# Patient Record
Sex: MALE | Race: ASIAN | NOT HISPANIC OR LATINO | Employment: FULL TIME | URBAN - METROPOLITAN AREA
[De-identification: names, ages, dates, MRNs, and addresses within clinical notes are randomized per-mention and may not be internally consistent; named-entity substitution may affect disease eponyms.]

---

## 2017-10-06 ENCOUNTER — ALLSCRIPTS OFFICE VISIT (OUTPATIENT)
Dept: OTHER | Facility: OTHER | Age: 47
End: 2017-10-06

## 2017-10-07 LAB
A/G RATIO (HISTORICAL): 1.5 (ref 1.2–2.2)
ALBUMIN SERPL BCP-MCNC: 4.7 G/DL (ref 3.5–5.5)
ALP SERPL-CCNC: 78 IU/L (ref 39–117)
ALT SERPL W P-5'-P-CCNC: 81 IU/L (ref 0–44)
AST SERPL W P-5'-P-CCNC: 35 IU/L (ref 0–40)
BILIRUB SERPL-MCNC: 0.5 MG/DL (ref 0–1.2)
BUN SERPL-MCNC: 10 MG/DL (ref 6–24)
BUN/CREA RATIO (HISTORICAL): 12 (ref 9–20)
CALCIUM SERPL-MCNC: 9.3 MG/DL (ref 8.7–10.2)
CHLORIDE SERPL-SCNC: 100 MMOL/L (ref 96–106)
CHOLEST SERPL-MCNC: 170 MG/DL (ref 100–199)
CO2 SERPL-SCNC: 24 MMOL/L (ref 18–29)
CREAT SERPL-MCNC: 0.85 MG/DL (ref 0.76–1.27)
EGFR AFRICAN AMERICAN (HISTORICAL): 120 ML/MIN/1.73
EGFR-AMERICAN CALC (HISTORICAL): 104 ML/MIN/1.73
GLUCOSE SERPL-MCNC: 186 MG/DL (ref 65–99)
HBA1C MFR BLD HPLC: 7.4 % (ref 4.8–5.6)
HDLC SERPL-MCNC: 41 MG/DL
LDLC SERPL CALC-MCNC: 106 MG/DL (ref 0–99)
POTASSIUM SERPL-SCNC: 4.2 MMOL/L (ref 3.5–5.2)
SODIUM SERPL-SCNC: 141 MMOL/L (ref 134–144)
TOT. GLOBULIN, SERUM (HISTORICAL): 3.2 G/DL (ref 1.5–4.5)
TOTAL PROTEIN (HISTORICAL): 7.9 G/DL (ref 6–8.5)
TRIGL SERPL-MCNC: 116 MG/DL (ref 0–149)
TSH SERPL DL<=0.05 MIU/L-ACNC: 0.79 UIU/ML (ref 0.45–4.5)

## 2017-10-07 NOTE — PROGRESS NOTES
Assessment  1  Abnormal fasting glucose (790 29) (R73 01)   2  Hyperlipidemia (272 4) (E78 5)   3  Hypertension (401 9) (I10)   4  Anxiety (300 00) (F41 9)    Plan  Abnormal fasting glucose, Hyperlipidemia, Hypertension, Low serum vitamin D    · (1) COMPREHENSIVE METABOLIC PANEL; Status:Active; Requested for:06Oct2017;    · (1) HEMOGLOBIN A1C; Status:Active; Requested for:06Oct2017;    · (1) LIPID PANEL FASTING W DIRECT LDL REFLEX; Status:Active; Requested  for:06Oct2017;    · (1) TSH; Status:Active; Requested LDK:[id number]; Anxiety    · ALPRAZolam 0 5 MG Oral Tablet Disintegrating; TAKE 0 5 MG Daily PRN anxiety  for flying  Hyperlipidemia    · Atorvastatin Calcium 20 MG Oral Tablet; Take 1 tablet by mouth  daily  Need for vaccination    · Fluzone Quadrivalent Intramuscular Suspension; INJECT 0 5  ML  Intramuscular; To Be Done: [id number]    Discussion/Summary    Hyperlipidemia  Continue atorvastatin and check fasting lab work today  Continue valsartan hydrochlorothiazide  Well-controlled at present  Primarily due to air travel  Refill Xanax  Continue Advair and albuterol inhaler  Mild intermittent  vaccine today  Lab work to include lipid, chem panel, TSH  Recheck in 6-12 months  Chief Complaint  Pt presents for medication f/u  Pt would like to receive influenza vaccine  History of Present Illness  Patient was seen for follow-up of chronic medical problems  He's been treated for elevated glucose, hyperlipidemia, hypertension, anxiety and asthma  He uses Proventil inhaler intermittently for asthmatic flares  He's on no medication for his blood sugar  He takes atorvastatin for his lipids and valsartan hydrochlorothiazide for his blood pressure  He does take Xanax on a limited basis generally only for flying which she does frequently throughout the course of the year for work  Review of Systems    Constitutional: No fever or chills, feels well, no tiredness, no recent weight gain or weight loss  Eyes: No complaints of eye pain, no red eyes, no discharge from eyes, no itchy eyes  ENT: no complaints of earache, no hearing loss, no nosebleeds, no nasal discharge, no sore throat, no hoarseness  Cardiovascular: No complaints of slow heart rate, no fast heart rate, no chest pain, no palpitations, no leg claudication, no lower extremity  Respiratory: No complaints of shortness of breath, no wheezing, no cough, no SOB on exertion, no orthopnea or PND  Gastrointestinal: No complaints of abdominal pain, no constipation, no nausea or vomiting, no diarrhea or bloody stools  Genitourinary: No complaints of dysuria, no incontinence, no hesitancy, no nocturia, no genital lesion, no testicular pain  Musculoskeletal: Pain in coccyx  Integumentary: No complaints of skin rash or skin lesions, no itching, no skin wound, no dry skin  Neurological: No compliants of headache, no confusion, no convulsions, no numbness or tingling, no dizziness or fainting, no limb weakness, no difficulty walking  Psychiatric: anxiety  Endocrine: No complaints of proptosis, no hot flashes, no muscle weakness, no erectile dysfunction, no deepening of the voice, no feelings of weakness  Hematologic/Lymphatic: No complaints of swollen glands, no swollen glands in the neck, does not bleed easily, no easy bruising  Active Problems  1  Abnormal fasting glucose (790 29) (R73 01)   2  Anxiety (300 00) (F41 9)   3  Asthma (493 90) (J45 909)   4  Hyperlipidemia (272 4) (E78 5)   5  Hypertension (401 9) (I10)   6  Insomnia (780 52) (G47 00)   7  Low serum vitamin D (790 6) (R79 89)   8  Scrotal mass (608 89) (N50 9)    Past Medical History  1  History of Acute allergic reaction (995 3) (T78 40XA)   2  History of Chronic Strained Lumbosacral Ligament (847 2)   3  History of Contact Dermatitis Of The Arm (692 9)   4  History of Depression screen (V79 0) (Z13 89)   5  History of Ear pressure (388 8) (H93 8X9)   6   History of acute sinusitis (V12 69) (Z87 09)   7  History of Myalgia And Myositis (729 1)   8  History of Obstructive sleep apnea (327 23) (G47 33)   9  History of Skin tag (701 9) (L91 8)   10  History of Thoracic myofascial strain (847 1) (S29 019A)    The active problems and past medical history were reviewed and updated today  Surgical History  1  History of Appendectomy   2  Denied: History Of Prior Surgery    Family History  Father    1  Family history of Diabetes Mellitus   2  Family history of Heart Disease  Family History    3  Family history of Allergies   4  Family history of Asthma (V17 5)   5  Family history of Diabetes Mellitus   6  Family history of Eczema   7  Family history of Heart Disease   8  Family history of Hypertension (V17 49)   9  Family history of Stroke Syndrome (V17 1)    Social History   · Alcohol Use (History)   · Denied: History of Drug Use   · Never A Smoker  The social history was reviewed and updated today  The social history was reviewed and is unchanged  Current Meds   1  Advair Diskus 500-50 MCG/DOSE Inhalation Aerosol Powder Breath Activated; Use 1   inhalation two times  daily; Therapy: 96XEZ1382 to (Evaluate:63Ihg1038)  Requested for: 20Ojb4157; Last   Rx:43Rkw4325 Ordered   2  ALPRAZolam 0 5 MG Oral Tablet Disintegrating; TAKE 0 5 MG Daily PRN anxiety for flying; Therapy: 41SPX6720 to (Last Rx:22Mar2017) Ordered   3  Atorvastatin Calcium 20 MG Oral Tablet; Take 1 tablet by mouth  daily; Therapy: 09EMC4809 to (Evaluate:02Gyt1811)  Requested for: 99XAV4967; Last   Rx:15Jun2017 Ordered   4  EpiPen 2-Drew 0 3 MG/0 3ML Injection Solution Auto-injector; INJECT 0 3ML   INTRAMUSCULARLY AS DIRECTED; Therapy: 18Opb5792 to (Last Rx:15Kat4810)  Requested for: 21TOV5979 Ordered   5  Montelukast Sodium 10 MG Oral Tablet; Take 1 tablet by mouth  daily; Therapy: 04CCS2317 to (Evaluate:16Xub0421)  Requested for: 80RBK2982; Last   Rx:15Jun2017 Ordered   6   Valsartan-Hydrochlorothiazide 160-12 5 MG Oral Tablet; TAKE 1 TABLET DAILY; Therapy: 66YFX1679 to (05 12 73 93 30)  Requested for: 10CGI5913; Last   Rx:60Zdo4808 Ordered   7  Ventolin  (90 Base) MCG/ACT Inhalation Aerosol Solution; INHALE 2 PUFFS   EVERY 4-6 HOURS AS NEEDED; Therapy: 99JSJ5522 to (Evaluate:23Oct2016)  Requested for: 95Tsj9835; Last   Rx:74Jvj8319 Ordered    The medication list was reviewed and updated today  Allergies  1  Penicillins  2  No Known Environmental Allergies   3  No Known Food Allergies    Vitals  Vital Signs    Recorded: 15MXT9877 09:40AM   Temperature 97 2 F, Tympanic   Heart Rate 84   Pulse Quality Normal   Respiration Quality Normal   Respiration 14   Systolic 645, LUE, Sitting   Diastolic 80, LUE, Sitting   Height 5 ft 11 in   Weight 250 lb 7 oz   BMI Calculated 34 93   BSA Calculated 2 32   O2 Saturation 96, RA   Pain Scale 6     Physical Exam    Constitutional   General appearance: No acute distress, well appearing and well nourished  Pulmonary   Respiratory effort: No increased work of breathing or signs of respiratory distress  Auscultation of lungs: Clear to auscultation, equal breath sounds bilaterally, no wheezes, no rales, no rhonci  Cardiovascular   Auscultation of heart: Normal rate and rhythm, normal S1 and S2, without murmurs  Examination of extremities for edema and/or varicosities: Normal     Carotid pulses: Normal     Lymphatic   Palpation of lymph nodes in neck: No lymphadenopathy  Musculoskeletal   Gait and station: Normal     Psychiatric   Orientation to person, place and time: Normal     Mood and affect: Normal          Results/Data  PHQ-9 Adult Depression Screening 58QEX3063 09:50AM User, s     Test Name Result Flag Reference   PHQ-9 Adult Depression Score 4     Over the last two weeks, how often have you been bothered by any of the following problems?   Little interest or pleasure in doing things: Several days - 1  Feeling down, depressed, or hopeless: Not at all - 0  Trouble falling or staying asleep, or sleeping too much: Several days - 1  Feeling tired or having little energy: Several days - 1  Poor appetite or over eating: Several days - 1  Feeling bad about yourself - or that you are a failure or have let yourself or your family down: Not at all - 0  Trouble concentrating on things, such as reading the newspaper or watching television: Not at all - 0  Moving or speaking so slowly that other people could have noticed   Or the opposite -  being so fidgety or restless that you have been moving around a lot more than usual: Not at all - 0  Thoughts that you would be better off dead, or of hurting yourself in some way: Not at all - 0   PHQ-9 Adult Depression Screening Negative     PHQ-9 Difficulty Level Not difficult at all     PHQ-9 Severity Minimal Depression         Signatures   Electronically signed by : Herb Boyle DO; Oct  6 2017 10:15AM EST                       (Author)

## 2018-01-12 NOTE — RESULT NOTES
Verified Results  (1) COMPREHENSIVE METABOLIC PANEL 80DLM5320 20:56UF Earl Chow     Test Name Result Flag Reference   Glucose, Serum 117 mg/dL H 65-99   BUN 14 mg/dL  6-24   Creatinine, Serum 1 04 mg/dL  0 76-1 27   eGFR If NonAfricn Am 86 mL/min/1 73  >59   eGFR If Africn Am 99 mL/min/1 73  >59   BUN/Creatinine Ratio 13  9-20   ALT (SGPT) 77 IU/L H 0-44   Albumin, Serum 4 6 g/dL  3 5-5 5   Globulin, Total 3 0 g/dL  1 5-4 5   A/G Ratio 1 5  1 1-2 5   Bilirubin, Total 0 6 mg/dL  0 0-1 2   Alkaline Phosphatase, S 67 IU/L     AST (SGOT) 37 IU/L  0-40   Sodium, Serum 139 mmol/L  134-144   Potassium, Serum 4 2 mmol/L  3 5-5 2   Chloride, Serum 99 mmol/L     Carbon Dioxide, Total 23 mmol/L  18-29   Calcium, Serum 9 6 mg/dL  8 7-10 2   Protein, Total, Serum 7 6 g/dL  6 0-8 5     (1) CBC/PLT/DIFF 72Yan3405 10:33AM Elliott Nam     Test Name Result Flag Reference   WBC 6 8 x10E3/uL  3 4-10 8   RBC 5 42 x10E6/uL  4 14-5 80   Hemoglobin 16 2 g/dL  12 6-17 7   Hematocrit 47 1 %  37 5-51 0   MCV 87 fL  79-97   MCH 29 9 pg  26 6-33 0   Baso (Absolute) 0 1 x10E3/uL  0 0-0 2   Immature Granulocytes 0 %     Immature Grans (Abs) 0 0 x10E3/uL  0 0-0 1   Eos 4 %     Basos 1 %     Neutrophils (Absolute) 4 2 x10E3/uL  1 4-7 0   Lymphs (Absolute) 1 8 x10E3/uL  0 7-3 1   Monocytes(Absolute) 0 5 x10E3/uL  0 1-0 9   Eos (Absolute) 0 3 x10E3/uL  0 0-0 4   MCHC 34 4 g/dL  31 5-35 7   RDW 13 7 %  12 3-15 4   Platelets 045 C96V7/RH  150-379   Neutrophils 61 %     Lymphs 27 %     Monocytes 7 %       (1) HEMOGLOBIN A1C 86Qhe3815 10:33AM Earl Chow     Test Name Result Flag Reference   Hemoglobin A1c 6 4 % H 4 8-5 6   Pre-diabetes: 5 7 - 6 4           Diabetes: >6 4           Glycemic control for adults with diabetes: <7 0     (1) LIPID PANEL FASTING W DIRECT LDL REFLEX 79Jfl7979 10:33AM Earl Chow     Test Name Result Flag Reference   Cholesterol, Total 245 mg/dL H 100-199   Triglycerides 192 mg/dL H 0-149 HDL Cholesterol 41 mg/dL  >39   According to ATP-III Guidelines, HDL-C >59 mg/dL is considered a  negative risk factor for CHD  LDL Cholesterol Calc 166 mg/dL H 0-99     (1) TSH 15Wuy7580 10:33AM [facility]     Test Name Result Flag Reference   TSH 1 360 uIU/mL  0 450-4 500     (1) VITAMIN D 25-HYDROXY 52Hku5523 10:33AM [facility]     Test Name Result Flag Reference   Vitamin D, 25-Hydroxy 39 5 ng/mL  30 0-100 0   Vitamin D deficiency has been defined by the [address] practice guideline as a  level of serum 25-OH vitamin D less than 20 ng/mL (1,2)  The Endocrine Society went on to further define vitamin D  insufficiency as a level between 21 and 29 ng/mL (2)  1  IOM ([place] [employer])  2010  Dietary reference     intakes for calcium and D  [facility]: The     [employer]  2  Alverto MF, Vianney NC, Isiah STARK, et al      Evaluation, treatment, and prevention of vitamin D     deficiency: an Endocrine Society clinical practice     guideline  JCEM  2011 Jul; 96(7):1911-30

## 2018-01-13 VITALS
SYSTOLIC BLOOD PRESSURE: 132 MMHG | RESPIRATION RATE: 14 BRPM | HEART RATE: 84 BPM | WEIGHT: 250.44 LBS | DIASTOLIC BLOOD PRESSURE: 80 MMHG | HEIGHT: 71 IN | BODY MASS INDEX: 35.06 KG/M2 | TEMPERATURE: 97.2 F | OXYGEN SATURATION: 96 %

## 2018-02-07 DIAGNOSIS — E78.5 HYPERLIPIDEMIA: ICD-10-CM

## 2018-07-19 ENCOUNTER — TELEPHONE (OUTPATIENT)
Dept: FAMILY MEDICINE CLINIC | Facility: CLINIC | Age: 48
End: 2018-07-19

## 2018-07-19 DIAGNOSIS — I10 HYPERTENSION, UNSPECIFIED TYPE: ICD-10-CM

## 2018-07-19 DIAGNOSIS — F41.9 ANXIETY: ICD-10-CM

## 2018-07-19 DIAGNOSIS — J45.909 UNCOMPLICATED ASTHMA, UNSPECIFIED ASTHMA SEVERITY, UNSPECIFIED WHETHER PERSISTENT: Primary | ICD-10-CM

## 2018-07-19 RX ORDER — VALSARTAN AND HYDROCHLOROTHIAZIDE 160; 12.5 MG/1; MG/1
1 TABLET, FILM COATED ORAL DAILY
COMMUNITY
Start: 2012-03-13 | End: 2018-07-20 | Stop reason: RX

## 2018-07-19 RX ORDER — ALPRAZOLAM 0.5 MG/1
TABLET, ORALLY DISINTEGRATING ORAL
Qty: 30 TABLET | Refills: 0 | Status: CANCELLED | OUTPATIENT
Start: 2018-07-19

## 2018-07-19 RX ORDER — MONTELUKAST SODIUM 10 MG/1
10 TABLET ORAL DAILY
Qty: 30 TABLET | Refills: 0 | Status: CANCELLED | OUTPATIENT
Start: 2018-07-19

## 2018-07-19 RX ORDER — ATORVASTATIN CALCIUM 20 MG/1
1 TABLET, FILM COATED ORAL DAILY
COMMUNITY
Start: 2016-09-20 | End: 2018-07-20 | Stop reason: SDUPTHER

## 2018-07-19 RX ORDER — ATORVASTATIN CALCIUM 20 MG/1
20 TABLET, FILM COATED ORAL DAILY
Qty: 30 TABLET | Refills: 0 | Status: CANCELLED | OUTPATIENT
Start: 2018-07-19

## 2018-07-19 RX ORDER — EPINEPHRINE 0.3 MG/.3ML
0.3 INJECTION SUBCUTANEOUS
COMMUNITY
Start: 2013-08-24 | End: 2018-08-24 | Stop reason: SDUPTHER

## 2018-07-19 RX ORDER — ALBUTEROL SULFATE 90 UG/1
2 AEROSOL, METERED RESPIRATORY (INHALATION)
COMMUNITY
Start: 2012-01-23 | End: 2018-08-24 | Stop reason: SDUPTHER

## 2018-07-19 RX ORDER — ALPRAZOLAM 0.5 MG/1
TABLET, ORALLY DISINTEGRATING ORAL DAILY
COMMUNITY
Start: 2014-10-07 | End: 2018-07-20 | Stop reason: SDUPTHER

## 2018-07-19 RX ORDER — MONTELUKAST SODIUM 10 MG/1
1 TABLET ORAL DAILY
COMMUNITY
Start: 2012-01-23 | End: 2018-07-20 | Stop reason: SDUPTHER

## 2018-07-19 NOTE — TELEPHONE ENCOUNTER
Pt came into the ofc he had called on Monday for refills on his meds he is now out of bld pressure and cholesterol [facility]    Valsartan hctz 160-12 5 needs to be changed due to recall    Atorvastain calcium 20mg  Take 1 tab daily  #30  [facility]    Montelukast sodium 10mg   Take 1 tab daily  #30  [facility]    Advair 500-50 mcg  Use 1 inhalation tow times daily  #60  [facility]    Alprazolam 0 5 mg oral tablet Disintegrating  Take 0 5 daily prn anxiety  #50  [facility]

## 2018-07-20 DIAGNOSIS — J45.909 ASTHMA, UNSPECIFIED ASTHMA SEVERITY, UNSPECIFIED WHETHER COMPLICATED, UNSPECIFIED WHETHER PERSISTENT: ICD-10-CM

## 2018-07-20 DIAGNOSIS — I10 ESSENTIAL HYPERTENSION: ICD-10-CM

## 2018-07-20 DIAGNOSIS — F41.9 ANXIETY: ICD-10-CM

## 2018-07-20 DIAGNOSIS — E78.5 HYPERLIPIDEMIA, UNSPECIFIED HYPERLIPIDEMIA TYPE: Primary | ICD-10-CM

## 2018-07-20 RX ORDER — LOSARTAN POTASSIUM AND HYDROCHLOROTHIAZIDE 12.5; 1 MG/1; MG/1
1 TABLET ORAL DAILY
Qty: 30 TABLET | Refills: 0 | Status: SHIPPED | OUTPATIENT
Start: 2018-07-20 | End: 2018-08-24

## 2018-07-20 RX ORDER — MONTELUKAST SODIUM 10 MG/1
10 TABLET ORAL DAILY
Qty: 30 TABLET | Refills: 0 | Status: SHIPPED | OUTPATIENT
Start: 2018-07-20 | End: 2018-08-24 | Stop reason: SDUPTHER

## 2018-07-20 RX ORDER — ALPRAZOLAM 0.5 MG/1
0.5 TABLET, ORALLY DISINTEGRATING ORAL AS NEEDED
Qty: 15 TABLET | Refills: 0 | Status: SHIPPED | OUTPATIENT
Start: 2018-07-20 | End: 2018-11-26 | Stop reason: SDUPTHER

## 2018-07-20 RX ORDER — ATORVASTATIN CALCIUM 20 MG/1
20 TABLET, FILM COATED ORAL DAILY
Qty: 30 TABLET | Refills: 0 | Status: SHIPPED | OUTPATIENT
Start: 2018-07-20 | End: 2018-08-24 | Stop reason: SDUPTHER

## 2018-07-20 NOTE — TELEPHONE ENCOUNTER
I called and spoke to Niharika Escobedo he was advised he is due for an appointment he needs to look at his schedule first then call to make an appointment  Pt was also made aware from another source about the Valsartan recall  Pt sated he does not use [facility] [place]  Pt stated it is ok but I offered and per pt's permission I deleted the [facility] form his account  Pt stated today it is ok

## 2018-07-20 NOTE — TELEPHONE ENCOUNTER
Call patient - I sent his prescriptions for 30 day supply - he is overdue for a visit  Please schedule  Also we had to change his Valsartan HCT to Losartan HCT because of recall of medication

## 2018-07-23 NOTE — TELEPHONE ENCOUNTER
Dr Cally Arroyo, can you please refuse patient Valsartan RX so this task can be completed  Thank you

## 2018-07-25 RX ORDER — VALSARTAN AND HYDROCHLOROTHIAZIDE 160; 12.5 MG/1; MG/1
1 TABLET, FILM COATED ORAL DAILY
Qty: 30 TABLET | Refills: 0 | OUTPATIENT
Start: 2018-07-25

## 2018-08-24 ENCOUNTER — OFFICE VISIT (OUTPATIENT)
Dept: FAMILY MEDICINE CLINIC | Facility: CLINIC | Age: 48
End: 2018-08-24
Payer: COMMERCIAL

## 2018-08-24 VITALS
TEMPERATURE: 98.6 F | HEART RATE: 69 BPM | OXYGEN SATURATION: 97 % | DIASTOLIC BLOOD PRESSURE: 90 MMHG | HEIGHT: 71 IN | RESPIRATION RATE: 16 BRPM | BODY MASS INDEX: 35.21 KG/M2 | WEIGHT: 251.5 LBS | SYSTOLIC BLOOD PRESSURE: 130 MMHG

## 2018-08-24 DIAGNOSIS — I10 ESSENTIAL HYPERTENSION: ICD-10-CM

## 2018-08-24 DIAGNOSIS — J45.909 ASTHMA, UNSPECIFIED ASTHMA SEVERITY, UNSPECIFIED WHETHER COMPLICATED, UNSPECIFIED WHETHER PERSISTENT: ICD-10-CM

## 2018-08-24 DIAGNOSIS — E78.5 HYPERLIPIDEMIA, UNSPECIFIED HYPERLIPIDEMIA TYPE: ICD-10-CM

## 2018-08-24 DIAGNOSIS — R73.09 ELEVATED GLUCOSE: Primary | ICD-10-CM

## 2018-08-24 DIAGNOSIS — T78.2XXD ANAPHYLAXIS, SUBSEQUENT ENCOUNTER: ICD-10-CM

## 2018-08-24 PROCEDURE — 1036F TOBACCO NON-USER: CPT | Performed by: PHYSICIAN ASSISTANT

## 2018-08-24 PROCEDURE — 36415 COLL VENOUS BLD VENIPUNCTURE: CPT | Performed by: PHYSICIAN ASSISTANT

## 2018-08-24 PROCEDURE — 99214 OFFICE O/P EST MOD 30 MIN: CPT | Performed by: PHYSICIAN ASSISTANT

## 2018-08-24 RX ORDER — MONTELUKAST SODIUM 10 MG/1
10 TABLET ORAL DAILY
Qty: 30 TABLET | Refills: 0 | Status: SHIPPED | OUTPATIENT
Start: 2018-08-24 | End: 2018-09-20 | Stop reason: SDUPTHER

## 2018-08-24 RX ORDER — DIPHENOXYLATE HYDROCHLORIDE AND ATROPINE SULFATE 2.5; .025 MG/1; MG/1
1 TABLET ORAL DAILY
COMMUNITY

## 2018-08-24 RX ORDER — ATORVASTATIN CALCIUM 20 MG/1
20 TABLET, FILM COATED ORAL DAILY
Qty: 30 TABLET | Refills: 0 | Status: SHIPPED | OUTPATIENT
Start: 2018-08-24 | End: 2018-09-20 | Stop reason: SDUPTHER

## 2018-08-24 RX ORDER — LISINOPRIL AND HYDROCHLOROTHIAZIDE 25; 20 MG/1; MG/1
1 TABLET ORAL DAILY
Qty: 30 TABLET | Refills: 0 | Status: SHIPPED | OUTPATIENT
Start: 2018-08-24 | End: 2018-09-20 | Stop reason: SDUPTHER

## 2018-08-24 RX ORDER — ALBUTEROL SULFATE 90 UG/1
2 AEROSOL, METERED RESPIRATORY (INHALATION) EVERY 4 HOURS PRN
Qty: 1 INHALER | Refills: 2 | Status: SHIPPED | OUTPATIENT
Start: 2018-08-24 | End: 2019-01-04

## 2018-08-24 RX ORDER — LOSARTAN POTASSIUM AND HYDROCHLOROTHIAZIDE 12.5; 1 MG/1; MG/1
1 TABLET ORAL DAILY
Qty: 30 TABLET | Refills: 0 | Status: CANCELLED | OUTPATIENT
Start: 2018-08-24

## 2018-08-24 RX ORDER — EPINEPHRINE 0.3 MG/.3ML
0.3 INJECTION SUBCUTANEOUS ONCE
Qty: 2 EACH | Refills: 0 | Status: SHIPPED | OUTPATIENT
Start: 2018-08-24 | End: 2019-09-03 | Stop reason: SDUPTHER

## 2018-08-24 RX ORDER — MELATONIN
1000 DAILY
COMMUNITY

## 2018-08-24 NOTE — ASSESSMENT & PLAN NOTE
Patient overdue for blood work, he is fasting today with lipid panel collected    Compliant on statin 20 mg

## 2018-08-24 NOTE — ASSESSMENT & PLAN NOTE
Patient's last blood work was October 2017  His A1c unfortunately increased from 6 4 to now 7 4 and has not had an appointment since his last blood work  Patient states that he has been trying to be a little more active but he has not been able to make any dietary adjustments being on the road a lot with little time  I discussed with patient the significance of this number and that this puts him into the diabetic range  I educated him about diabetes and the concerns associated with uncontrolled diabetes including risk for retinopathy, neuropathy and nephropathy  Patient expresses full understanding and the need to control sugars  Since he has not had blood work since October of last year I recommended we obtain new fasting blood work and see where he is at and treat accordingly  Blood work collected today fasting

## 2018-08-24 NOTE — ASSESSMENT & PLAN NOTE
Patient's blood pressure is elevated today, even with recheck  He is getting higher readings at home with his pressure cuffs  He was switched from valsartan -HCTZ to losartan HCTZ due to recall  He was on losartan 100 mg and with his blood pressure still remaining elevated I recommended that we switch to lisinopril hydrochlorothiazide  I will have him try 20 mg of the lisinopril and increase the HCTZ to 25 mg from the 12 5 he was on prior  We will see him back in about 1 month for recheck

## 2018-08-24 NOTE — PROGRESS NOTES
Assessment/Plan:    Asthma    Stable on Advair 1   Inhale once or twice a day with rescue inhaler as needed and Singulair  Hypertension   Patient's blood pressure is elevated today, even with recheck  He is getting higher readings at home with his pressure cuffs  He was switched from valsartan -HCTZ to losartan HCTZ due to recall  He was on losartan 100 mg and with his blood pressure still remaining elevated I recommended that we switch to lisinopril hydrochlorothiazide  I will have him try 20 mg of the lisinopril and increase the HCTZ to 25 mg from the 12 5 he was on prior  We will see him back in about 1 month for recheck  Hyperlipidemia    Patient overdue for blood work, he is fasting today with lipid panel collected  Compliant on statin 20 mg    Elevated glucose   Patient's last blood work was October 2017  His A1c unfortunately increased from 6 4 to now 7 4 and has not had an appointment since his last blood work  Patient states that he has been trying to be a little more active but he has not been able to make any dietary adjustments being on the road a lot with little time  I discussed with patient the significance of this number and that this puts him into the diabetic range  I educated him about diabetes and the concerns associated with uncontrolled diabetes including risk for retinopathy, neuropathy and nephropathy  Patient expresses full understanding and the need to control sugars  Since he has not had blood work since October of last year I recommended we obtain new fasting blood work and see where he is at and treat accordingly  Blood work collected today fasting  Diagnoses and all orders for this visit:    Elevated glucose  -     CBC and differential  -     Comprehensive metabolic panel  -     Lipid panel  -     Hemoglobin A1c (w/out EAG)    Essential hypertension  -     lisinopril-hydrochlorothiazide (PRINZIDE,ZESTORETIC) 20-25 MG per tablet;  Take 1 tablet by mouth daily  - CBC and differential  -     Comprehensive metabolic panel  -     Lipid panel  -     Hemoglobin A1c (w/out EAG)    Hyperlipidemia, unspecified hyperlipidemia type  -     atorvastatin (LIPITOR) 20 mg tablet; Take 1 tablet (20 mg total) by mouth daily  -     CBC and differential  -     Comprehensive metabolic panel  -     Lipid panel  -     Hemoglobin A1c (w/out EAG)    Asthma, unspecified asthma severity, unspecified whether complicated, unspecified whether persistent  -     albuterol (VENTOLIN HFA) 90 mcg/act inhaler; Inhale 2 puffs every 4 (four) hours as needed for wheezing or shortness of breath  -     fluticasone-salmeterol (ADVAIR DISKUS) 500-50 mcg/dose inhaler; Inhale 1 puff 2 (two) times a day  -     montelukast (SINGULAIR) 10 mg tablet; Take 1 tablet (10 mg total) by mouth daily    Anaphylaxis, subsequent encounter  -     EPINEPHrine (EPIPEN 2-ANDRES) 0 3 mg/0 3 mL SOAJ; Inject 0 3 mL (0 3 mg total) into a muscle once for 1 dose    Other orders  -     Cancel: losartan-hydrochlorothiazide (HYZAAR) 100-12 5 MG per tablet; Take 1 tablet by mouth daily  -     cholecalciferol (VITAMIN D3) 1,000 units tablet; Take 1,000 Units by mouth daily  -     multivitamin (THERAGRAN) TABS; Take 1 tablet by mouth daily        41-year-old male presenting today for med check for chronic conditions as above  Unfortunately his last blood work has not been since October of last year and it looks like his A1c jumped from prediabetic range to 7 4  However he has not had an appointment since that time last year  Plan for his medical conditions as above  Blood pressure medicine changed due to poor response and switch to losartan HCTZ due to recall  Patient to start lisinopril HCTZ 20-25 and check BP is a home  I will have him follow up with me in 1 month  Blood work fasting collected today and we will discuss results at follow-up and determine if further treatment is necessary    I expressed to patient at least the importance of getting an eye exam once a year and making sure that his eye doctor is fully aware of his conditions  Chief Complaint   Patient presents with    Hypertension       Subjective:      Patient ID: Delfin Paris is a 49 y o  male     46y/o male here today for med check  He states he travels a lot and eats out a lot  He drink adult beverages 2 glasses/meal sometimes, wine/whiskey/beer  He has been exercising more lately  Dad has HTN and diabetes  Last A1C 2017 increased from 6 4 to 7 4  He sees Dr Josefa Arias on a yearly basis   For eye exam  He physically feels well aside from feeling tired  He checks BP's at home and since switching ARB his numbers have been higher About 171/115, 150/104  Drinks 2-3 cups caffeinated coffee  He denies any chest pain, shortness of breath, dizziness or lightheadedness  He denies any exertional symptoms  Patient states that he would like a refill on an EpiPen  He has never had anaphylaxis though he states that he has had issues with random hives and has been recommended that he carry an EpiPen on hand   Asthma has been stable with patient mainly using Advair 1 puff daily instead of twice  He rarely uses rescue inhaler and notices allergies as a trigger  The following portions of the patient's history were reviewed and updated as appropriate: allergies, current medications, past family history, past medical history, past social history, past surgical history and problem list     Review of Systems   Constitutional: Positive for fatigue  Respiratory: Negative  Cardiovascular: Negative  Gastrointestinal: Negative  Endocrine: Negative  Genitourinary: Negative  Neurological: Negative  Psychiatric/Behavioral: Negative            Objective:      /90   Pulse 69   Temp 98 6 °F (37 °C) (Tympanic)   Resp 16   Ht 5' 11" (1 803 m)   Wt 114 kg (251 lb 8 oz)   SpO2 97%   BMI 35 08 kg/m²          Physical Exam   Constitutional: He is oriented to person, place, and time  He appears well-developed  Obesity  BMI 35   Neck: Neck supple  Normal carotid pulses present  Carotid bruit is not present  No thyromegaly present  Cardiovascular: Normal rate, regular rhythm, normal heart sounds and normal pulses  Pulmonary/Chest: Effort normal and breath sounds normal    Lymphadenopathy:     He has no cervical adenopathy  Neurological: He is alert and oriented to person, place, and time  Skin: Skin is intact  Psychiatric: He has a normal mood and affect  Vitals reviewed

## 2018-08-25 LAB
ALBUMIN SERPL-MCNC: 4.4 G/DL (ref 3.6–5.1)
ALBUMIN/GLOB SERPL: 1.3 (CALC) (ref 1–2.5)
ALP SERPL-CCNC: 60 U/L (ref 40–115)
ALT SERPL-CCNC: 86 U/L (ref 9–46)
AST SERPL-CCNC: 32 U/L (ref 10–40)
BASOPHILS # BLD AUTO: 60 CELLS/UL (ref 0–200)
BASOPHILS NFR BLD AUTO: 0.8 %
BILIRUB SERPL-MCNC: 0.7 MG/DL (ref 0.2–1.2)
BUN SERPL-MCNC: 14 MG/DL (ref 7–25)
BUN/CREAT SERPL: ABNORMAL (CALC) (ref 6–22)
CALCIUM SERPL-MCNC: 9.4 MG/DL (ref 8.6–10.3)
CHLORIDE SERPL-SCNC: 102 MMOL/L (ref 98–110)
CHOLEST SERPL-MCNC: 145 MG/DL
CHOLEST/HDLC SERPL: 3.5 (CALC)
CO2 SERPL-SCNC: 25 MMOL/L (ref 20–32)
CREAT SERPL-MCNC: 0.98 MG/DL (ref 0.6–1.35)
EOSINOPHIL # BLD AUTO: 263 CELLS/UL (ref 15–500)
EOSINOPHIL NFR BLD AUTO: 3.5 %
ERYTHROCYTE [DISTWIDTH] IN BLOOD BY AUTOMATED COUNT: 13 % (ref 11–15)
GLOBULIN SER CALC-MCNC: 3.3 G/DL (CALC) (ref 1.9–3.7)
GLUCOSE SERPL-MCNC: 148 MG/DL (ref 65–99)
HBA1C MFR BLD: 7.1 % OF TOTAL HGB
HCT VFR BLD AUTO: 47.5 % (ref 38.5–50)
HDLC SERPL-MCNC: 42 MG/DL
HGB BLD-MCNC: 15.8 G/DL (ref 13.2–17.1)
LDLC SERPL CALC-MCNC: 84 MG/DL (CALC)
LYMPHOCYTES # BLD AUTO: 1905 CELLS/UL (ref 850–3900)
LYMPHOCYTES NFR BLD AUTO: 25.4 %
MCH RBC QN AUTO: 29.7 PG (ref 27–33)
MCHC RBC AUTO-ENTMCNC: 33.3 G/DL (ref 32–36)
MCV RBC AUTO: 89.3 FL (ref 80–100)
MONOCYTES # BLD AUTO: 480 CELLS/UL (ref 200–950)
MONOCYTES NFR BLD AUTO: 6.4 %
NEUTROPHILS # BLD AUTO: 4793 CELLS/UL (ref 1500–7800)
NEUTROPHILS NFR BLD AUTO: 63.9 %
NONHDLC SERPL-MCNC: 103 MG/DL (CALC)
PLATELET # BLD AUTO: 181 THOUSAND/UL (ref 140–400)
PMV BLD REES-ECKER: 10.7 FL (ref 7.5–12.5)
POTASSIUM SERPL-SCNC: 4.1 MMOL/L (ref 3.5–5.3)
PROT SERPL-MCNC: 7.7 G/DL (ref 6.1–8.1)
RBC # BLD AUTO: 5.32 MILLION/UL (ref 4.2–5.8)
SL AMB EGFR AFRICAN AMERICAN: 105 ML/MIN/1.73M2
SL AMB EGFR NON AFRICAN AMERICAN: 91 ML/MIN/1.73M2
SODIUM SERPL-SCNC: 137 MMOL/L (ref 135–146)
TRIGL SERPL-MCNC: 91 MG/DL
WBC # BLD AUTO: 7.5 THOUSAND/UL (ref 3.8–10.8)

## 2018-08-25 PROCEDURE — 3045F PR MOST RECENT HEMOGLOBIN A1C LEVEL 7.0-9.0%: CPT | Performed by: PHYSICIAN ASSISTANT

## 2018-08-30 DIAGNOSIS — IMO0001 UNCONTROLLED TYPE 2 DIABETES MELLITUS WITHOUT COMPLICATION, WITHOUT LONG-TERM CURRENT USE OF INSULIN: Primary | ICD-10-CM

## 2018-08-30 PROBLEM — R73.09 ELEVATED GLUCOSE: Status: RESOLVED | Noted: 2018-08-24 | Resolved: 2018-08-30

## 2018-09-19 DIAGNOSIS — J45.909 ASTHMA, UNSPECIFIED ASTHMA SEVERITY, UNSPECIFIED WHETHER COMPLICATED, UNSPECIFIED WHETHER PERSISTENT: ICD-10-CM

## 2018-09-19 DIAGNOSIS — I10 ESSENTIAL HYPERTENSION: ICD-10-CM

## 2018-09-19 DIAGNOSIS — E78.5 HYPERLIPIDEMIA, UNSPECIFIED HYPERLIPIDEMIA TYPE: ICD-10-CM

## 2018-09-19 DIAGNOSIS — IMO0001 UNCONTROLLED TYPE 2 DIABETES MELLITUS WITHOUT COMPLICATION, WITHOUT LONG-TERM CURRENT USE OF INSULIN: ICD-10-CM

## 2018-09-19 RX ORDER — MONTELUKAST SODIUM 10 MG/1
10 TABLET ORAL DAILY
Qty: 30 TABLET | Refills: 0 | Status: CANCELLED | OUTPATIENT
Start: 2018-09-19

## 2018-09-19 RX ORDER — LISINOPRIL AND HYDROCHLOROTHIAZIDE 25; 20 MG/1; MG/1
1 TABLET ORAL DAILY
Qty: 30 TABLET | Refills: 0 | Status: CANCELLED | OUTPATIENT
Start: 2018-09-19

## 2018-09-19 RX ORDER — ATORVASTATIN CALCIUM 20 MG/1
20 TABLET, FILM COATED ORAL DAILY
Qty: 30 TABLET | Refills: 0 | Status: CANCELLED | OUTPATIENT
Start: 2018-09-19

## 2018-09-20 RX ORDER — ATORVASTATIN CALCIUM 20 MG/1
20 TABLET, FILM COATED ORAL DAILY
Qty: 90 TABLET | Refills: 0 | Status: SHIPPED | OUTPATIENT
Start: 2018-09-20 | End: 2018-09-26 | Stop reason: SDUPTHER

## 2018-09-20 RX ORDER — LISINOPRIL AND HYDROCHLOROTHIAZIDE 25; 20 MG/1; MG/1
1 TABLET ORAL DAILY
Qty: 90 TABLET | Refills: 0 | Status: SHIPPED | OUTPATIENT
Start: 2018-09-20 | End: 2018-11-26 | Stop reason: SDUPTHER

## 2018-09-20 RX ORDER — MONTELUKAST SODIUM 10 MG/1
10 TABLET ORAL DAILY
Qty: 90 TABLET | Refills: 0 | Status: SHIPPED | OUTPATIENT
Start: 2018-09-20 | End: 2018-11-26 | Stop reason: SDUPTHER

## 2018-09-20 NOTE — TELEPHONE ENCOUNTER
From: Rafa Benitez  Sent: 9/19/2018 11:46 AM EDT  Subject: Medication Renewal Request    Virginie Pratt would like a refill of the following medications:     metFORMIN (GLUCOPHAGE) 500 mg tablet Ras Roper PA-C]    Preferred pharmacy: [facility]/PHARMACY #[id number] : [id number]    Comment:  90 day refill    Medication renewals requested in this message routed separately:     montelukast (SINGULAIR) 10 mg tablet [Karlee Juarez PA-C]     atorvastatin (LIPITOR) 20 mg tablet [Karlee Juarez PA-C]     lisinopril-hydrochlorothiazide (PRINZIDE,ZESTORETIC) 20-25 MG per tablet Radha Colunga PA-C]

## 2018-09-26 DIAGNOSIS — E78.5 HYPERLIPIDEMIA, UNSPECIFIED HYPERLIPIDEMIA TYPE: ICD-10-CM

## 2018-09-27 RX ORDER — ATORVASTATIN CALCIUM 20 MG/1
20 TABLET, FILM COATED ORAL DAILY
Qty: 90 TABLET | Refills: 1 | Status: SHIPPED | OUTPATIENT
Start: 2018-09-27 | End: 2018-11-26 | Stop reason: SDUPTHER

## 2018-09-27 NOTE — TELEPHONE ENCOUNTER
From: Shanti Whelan  Sent: 9/26/2018 6:17 PM EDT  Subject: Medication Renewal Request    Viri Min would like a refill of the following medications:     atorvastatin (LIPITOR) 20 mg tablet Melinda Slater DO]    Preferred pharmacy: [facility]/PHARMACY #[id number] : [id number]    Comment:  I am not sure what happened but this one was missed when refilling my other prescriptions    Please refill 90 days

## 2018-11-26 DIAGNOSIS — IMO0001 UNCONTROLLED TYPE 2 DIABETES MELLITUS WITHOUT COMPLICATION, WITHOUT LONG-TERM CURRENT USE OF INSULIN: ICD-10-CM

## 2018-11-26 DIAGNOSIS — F41.9 ANXIETY: ICD-10-CM

## 2018-11-26 DIAGNOSIS — J45.909 ASTHMA, UNSPECIFIED ASTHMA SEVERITY, UNSPECIFIED WHETHER COMPLICATED, UNSPECIFIED WHETHER PERSISTENT: ICD-10-CM

## 2018-11-26 DIAGNOSIS — I10 ESSENTIAL HYPERTENSION: ICD-10-CM

## 2018-11-26 DIAGNOSIS — E78.5 HYPERLIPIDEMIA, UNSPECIFIED HYPERLIPIDEMIA TYPE: ICD-10-CM

## 2018-11-26 RX ORDER — ALPRAZOLAM 0.5 MG/1
0.5 TABLET, ORALLY DISINTEGRATING ORAL AS NEEDED
Qty: 15 TABLET | Refills: 0 | Status: SHIPPED | OUTPATIENT
Start: 2018-11-26 | End: 2019-04-23 | Stop reason: SDUPTHER

## 2018-11-26 RX ORDER — LISINOPRIL AND HYDROCHLOROTHIAZIDE 25; 20 MG/1; MG/1
1 TABLET ORAL DAILY
Qty: 90 TABLET | Refills: 0 | Status: SHIPPED | OUTPATIENT
Start: 2018-11-26 | End: 2019-03-22 | Stop reason: SDUPTHER

## 2018-11-26 RX ORDER — MONTELUKAST SODIUM 10 MG/1
10 TABLET ORAL DAILY
Qty: 90 TABLET | Refills: 0 | Status: SHIPPED | OUTPATIENT
Start: 2018-11-26 | End: 2019-03-22 | Stop reason: SDUPTHER

## 2018-11-26 NOTE — TELEPHONE ENCOUNTER
Please call pt - He needs a 30min appt for f/u with me - never scheduled f/u from his visit aug 2018   Needs to review BW and recheck his BP on new medication

## 2018-11-26 NOTE — TELEPHONE ENCOUNTER
From: Jayla Aponte  Sent: 11/26/2018 12:05 PM EST  Subject: Medication Renewal Request    Dorysmagda Daniel Minerey would like a refill of the following medications:     metFORMIN (GLUCOPHAGE) 500 mg tablet [facility], DO]     montelukast (SINGULAIR) 10 mg tablet [email or web address] Neil, DO]     lisinopril-hydrochlorothiazide (PRINZIDE,ZESTORETIC) 20-25 MG per tablet [email or web address] Neil, DO]    Preferred pharmacy: [facility]/PHARMACY #[id number] : [id number]    Comment:      Medication renewals requested in this message routed separately:     ALPRAZolam (NIRAVAM) 0 5 MG dissolvable tablet Adaline [place], DO]       fluticasone-salmeterol (ADVAIR DISKUS) 500-50 mcg/dose inhaler Tony Juarez PA-C]     atorvastatin (LIPITOR) 20 mg tablet NAZIA AgeeC]

## 2018-11-26 NOTE — TELEPHONE ENCOUNTER
From: Laurel Dubin  Sent: 11/26/2018 12:05 PM EST  Subject: Medication Renewal Request    Maria Luz Hilaria VALDEZ   Vanda Veliz would like a refill of the following medications:     ALPRAZolam (NIRAVAM) 0 5 MG dissolvable tablet Rio Martin DO]    Preferred pharmacy: [facility]/PHARMACY #[id number] : [id number]    Comment:      Medication renewals requested in this message routed separately:      metFORMIN (GLUCOPHAGE) 500 mg tablet Marstephane Katz, DO]     montelukast (SINGULAIR) 10 mg tablet Marvene Lame, DO]     lisinopril-hydrochlorothiazide (PRINZIDE,ZESTORETIC) 20-25 MG per tablet Marvene Lame, DO]       fluticasone-salmeterol (ADVAIR DISKUS) 500-50 mcg/dose inhaler Ki Juarez PA-C]     atorvastatin (LIPITOR) 20 mg tablet Betty Ag PA-C]

## 2018-11-27 RX ORDER — ATORVASTATIN CALCIUM 20 MG/1
20 TABLET, FILM COATED ORAL DAILY
Qty: 90 TABLET | Refills: 0 | Status: SHIPPED | OUTPATIENT
Start: 2018-11-27 | End: 2019-09-19 | Stop reason: SDUPTHER

## 2018-11-27 NOTE — TELEPHONE ENCOUNTER
From: Jayla Aponte  Sent: 11/26/2018 12:05 PM EST  Subject: Medication Renewal Request    Dorysmagda Daniel Aleman would like a refill of the following medications:     fluticasone-salmeterol (ADVAIR DISKUS) 500-50 mcg/dose inhaler Tony Juarez PA-C]     atorvastatin (LIPITOR) 20 mg tablet Dari Winkler PA-C]    Preferred pharmacy: [facility]/PHARMACY #[id number] : [id number]    Comment:      Medication renewals requested in this message routed separately:     ALPRAZolam (NIRAVAM) 0 5 MG dissolvable tablet Adaline Trona, DO]       metFORMIN (GLUCOPHAGE) 500 mg tablet [facility], DO]     montelukast (SINGULAIR) 10 mg tablet [facility], DO]     lisinopril-hydrochlorothiazide (PRINZIDE,ZESTORETIC) 20-25 MG per tablet [facility], DO]

## 2018-11-27 NOTE — TELEPHONE ENCOUNTER
Pt needs a f/u appt  He was supposed to get FBW done ad f/u with me one month after he saw me in august  Please schedule 30min

## 2018-12-07 ENCOUNTER — CLINICAL SUPPORT (OUTPATIENT)
Dept: FAMILY MEDICINE CLINIC | Facility: CLINIC | Age: 48
End: 2018-12-07
Payer: COMMERCIAL

## 2018-12-07 DIAGNOSIS — IMO0001 UNCONTROLLED TYPE 2 DIABETES MELLITUS WITHOUT COMPLICATION: Primary | ICD-10-CM

## 2018-12-07 DIAGNOSIS — E78.5 HYPERLIPIDEMIA: ICD-10-CM

## 2018-12-07 PROCEDURE — 36415 COLL VENOUS BLD VENIPUNCTURE: CPT | Performed by: PHYSICIAN ASSISTANT

## 2018-12-10 LAB
ALBUMIN/CREAT UR: 9 MCG/MG CREAT
CHOLEST SERPL-MCNC: 118 MG/DL
CHOLEST/HDLC SERPL: 3.5 (CALC)
CREAT UR-MCNC: 46 MG/DL (ref 20–320)
EST. AVERAGE GLUCOSE BLD GHB EST-MCNC: 146 (CALC)
EST. AVERAGE GLUCOSE BLD GHB EST-SCNC: 8.1 (CALC)
HBA1C MFR BLD: 6.7 % OF TOTAL HGB
HDLC SERPL-MCNC: 34 MG/DL
LDLC SERPL CALC-MCNC: 66 MG/DL (CALC)
MICROALBUMIN UR-MCNC: 0.4 MG/DL
NONHDLC SERPL-MCNC: 84 MG/DL (CALC)
TRIGL SERPL-MCNC: 95 MG/DL

## 2019-01-04 ENCOUNTER — OFFICE VISIT (OUTPATIENT)
Dept: FAMILY MEDICINE CLINIC | Facility: CLINIC | Age: 49
End: 2019-01-04
Payer: COMMERCIAL

## 2019-01-04 VITALS
TEMPERATURE: 97.3 F | RESPIRATION RATE: 17 BRPM | OXYGEN SATURATION: 98 % | HEIGHT: 70 IN | DIASTOLIC BLOOD PRESSURE: 80 MMHG | SYSTOLIC BLOOD PRESSURE: 110 MMHG | BODY MASS INDEX: 34.52 KG/M2 | WEIGHT: 241.1 LBS | HEART RATE: 75 BPM

## 2019-01-04 DIAGNOSIS — E78.5 HYPERLIPIDEMIA, UNSPECIFIED HYPERLIPIDEMIA TYPE: ICD-10-CM

## 2019-01-04 DIAGNOSIS — E11.9 CONTROLLED TYPE 2 DIABETES MELLITUS WITHOUT COMPLICATION, WITHOUT LONG-TERM CURRENT USE OF INSULIN (HCC): Primary | ICD-10-CM

## 2019-01-04 DIAGNOSIS — J45.909 ASTHMA, UNSPECIFIED ASTHMA SEVERITY, UNSPECIFIED WHETHER COMPLICATED, UNSPECIFIED WHETHER PERSISTENT: ICD-10-CM

## 2019-01-04 DIAGNOSIS — I10 ESSENTIAL HYPERTENSION: ICD-10-CM

## 2019-01-04 PROCEDURE — 99214 OFFICE O/P EST MOD 30 MIN: CPT | Performed by: PHYSICIAN ASSISTANT

## 2019-01-04 PROCEDURE — 3074F SYST BP LT 130 MM HG: CPT | Performed by: PHYSICIAN ASSISTANT

## 2019-01-04 PROCEDURE — 3008F BODY MASS INDEX DOCD: CPT | Performed by: PHYSICIAN ASSISTANT

## 2019-01-04 PROCEDURE — 3079F DIAST BP 80-89 MM HG: CPT | Performed by: PHYSICIAN ASSISTANT

## 2019-01-04 PROCEDURE — 1036F TOBACCO NON-USER: CPT | Performed by: PHYSICIAN ASSISTANT

## 2019-01-04 RX ORDER — ALBUTEROL SULFATE 90 UG/1
2 AEROSOL, METERED RESPIRATORY (INHALATION) EVERY 6 HOURS PRN
Qty: 3 INHALER | Refills: 3
Start: 2019-01-04 | End: 2021-11-15

## 2019-01-04 NOTE — ASSESSMENT & PLAN NOTE
Lab Results   Component Value Date    HGBA1C 6 7 (H) 12/07/2018        hemoglobin A1c improved to 6 7 from 7 1  He will continue metformin 500 mg daily  I exam this month with his eye doctor  Foot exam performed today  Patient on an Ace inhibitor and a statin  Diet and exercise stressed

## 2019-01-04 NOTE — ASSESSMENT & PLAN NOTE
Lipid panel reviewed with total cholesterol 118, HDL 34, LDL 66, triglycerides 95   He will continue on current dose of statin - - -

## 2019-01-04 NOTE — ASSESSMENT & PLAN NOTE
Blood pressure significantly improved with current blood pressure 110/80 on lisinopril HCT Z  However he does express that he has an intermittent rare cough that is not bothersome  He states he just recently got a 90 day supply and will continue this and see how he does and call when he is due for refill if he would like to change the medication  It has significantly improved since switching from losartan HCTZ

## 2019-01-04 NOTE — PROGRESS NOTES
Assessment/Plan:    Controlled type 2 diabetes mellitus without complication, without long-term current use of insulin (HCC)  Lab Results   Component Value Date    HGBA1C 6 7 (H) 12/07/2018        hemoglobin A1c improved to 6 7 from 7 1  He will continue metformin 500 mg daily  I exam this month with his eye doctor  Foot exam performed today  Patient on an Ace inhibitor and a statin  Diet and exercise stressed  Hypertension    Blood pressure significantly improved with current blood pressure 110/80 on lisinopril HCT Z  However he does express that he has an intermittent rare cough that is not bothersome  He states he just recently got a 90 day supply and will continue this and see how he does and call when he is due for refill if he would like to change the medication  It has significantly improved since switching from losartan HCTZ  Hyperlipidemia    Lipid panel reviewed with total cholesterol 118, HDL 34, LDL 66, triglycerides 95  He will continue on current dose of statin       Diagnoses and all orders for this visit:    Controlled type 2 diabetes mellitus without complication, without long-term current use of insulin (HCC)    Essential hypertension  -     CBC and differential; Future  -     Comprehensive metabolic panel; Future  -     Lipid panel; Future  -     Hemoglobin A1c (w/out EAG); Future    Hyperlipidemia, unspecified hyperlipidemia type    Asthma, unspecified asthma severity, unspecified whether complicated, unspecified whether persistent  -     albuterol (PROAIR HFA) 90 mcg/act inhaler; Inhale 2 puffs every 6 (six) hours as needed for wheezing  -     fluticasone-salmeterol (ADVAIR DISKUS) 500-50 mcg/dose inhaler; Inhale 1 puff 2 (two) times a day       Patient is a 62-year-old male presenting today for follow-up to hypertension and diabetes which were both uncontrolled at his last appointment August   Since that time his A1c improved to 6 7    His blood pressure has significantly improved at  110/80 and he has noticed significant improvement of BP is at home as well since switching from losartan HCTZ to lisinopril HCTZ  He does note a cough but just got a 90 day supply and it is not bothersome for him so he states he will monitor this and call if he would like to consider switching to something else  Diet and exercise was discussed with patient in detail today but it is hard for him to exercise being that he is out on the road a lot and also eats out a lot at restaurants  He will try to work on this as best he can  We will see him back in about 6 months with fasting blood work prior  Once again foot exam performed today  He will see an eye doctor this month and I requested that he try to get them to fax a copy of his office visit to us  Patient also expressed concern about carotid testing as his dad  of a heart attack in his brother also has heart disease as well and carotid stenosis (?)  I gave patient a vascular screening through [address] [place] [address] that he can certainly consider but he is asymptomatic from a cardiovascular standpoint  Chief Complaint   Patient presents with    Follow-up     pt here for his f/u to his high glucose and to review labs       Subjective:      Patient ID: Mark Caban is a 51 y o  male  65-year-old male  Presenting today for follow-up to his diabetes and hypertension  Unfortunately he is reporting an intermittent rare cough since being switched to lisinopril HCTZ from his losartan HCTZ which was not effective at treating his blood pressure  He was switched from valsartan due to recall  Otherwise he states his blood pressures have been much better than what they have been  He unfortunately remain sedentary because of traveling a lot with his job and sitting at a desk  He has been slightly better with  His diet stating that he is trying to eat more fish but he does eat out a lot    He denies any specific symptoms of chest pain or shortness of breath, no dizziness or lightheadedness  He states he does wake up sometimes in the morning with tingling in his feet and some calf cramping but this is inconsistent and denies any problems or symptoms during the day  He is compliant on his medications including metformin daily in addition to lisinopril HCTZ as well as statin  He has an eye doctor appointment coming up this month  The following portions of the patient's history were reviewed and updated as appropriate: allergies, current medications, past family history, past medical history, past social history, past surgical history and problem list     Review of Systems   Constitutional: Negative  Respiratory: Negative  Cardiovascular: Negative  Gastrointestinal: Negative  Genitourinary: Negative  Neurological:        As in HPI   Psychiatric/Behavioral: Negative  Objective:      /80 (BP Location: Left arm, Patient Position: Sitting, Cuff Size: Adult)   Pulse 75   Temp (!) 97 3 °F (36 3 °C) (Tympanic)   Resp 17   Ht 5' 9 5" (1 765 m)   Wt 109 kg (241 lb 1 6 oz)   SpO2 98%   BMI 35 09 kg/m²          Physical Exam   Constitutional: He is oriented to person, place, and time  He appears well-developed  No distress  BMI 35 09   Neck: Neck supple  Normal carotid pulses present  Carotid bruit is not present  Cardiovascular: Normal rate, regular rhythm, normal heart sounds and normal pulses  Pulses are no weak pulses  Pulses:       Dorsalis pedis pulses are 2+ on the right side, and 2+ on the left side  Pulmonary/Chest: Effort normal and breath sounds normal    Abdominal: Normal appearance and bowel sounds are normal  There is no tenderness  abd obesity   Feet:   Right Foot:   Skin Integrity: Negative for ulcer, skin breakdown, erythema, warmth, callus or dry skin  Left Foot:   Skin Integrity: Negative for ulcer, skin breakdown, erythema, warmth, callus or dry skin     Lymphadenopathy:     He has no cervical adenopathy  Neurological: He is alert and oriented to person, place, and time  Skin: Skin is intact  Psychiatric: He has a normal mood and affect  Vitals reviewed  Patient's shoes and socks removed  Right Foot/Ankle   Right Foot Inspection  Skin Exam: skin normal and skin intact no dry skin, no warmth, no callus, no erythema, no maceration, no abnormal color, no pre-ulcer, no ulcer and no callus                          Toe Exam: ROM and strength within normal limits  Sensory   Vibration: intact  Proprioception: intact   Monofilament testing: intact  Vascular  Capillary refills: < 3 seconds  The right DP pulse is 2+  Left Foot/Ankle  Left Foot Inspection  Skin Exam: skin normal and skin intactno dry skin, no warmth, no erythema, no maceration, normal color, no pre-ulcer, no ulcer and no callus                         Toe Exam: ROM and strength within normal limits                   Sensory   Vibration: intact  Proprioception: intact  Monofilament: intact  Vascular  Capillary refills: < 3 seconds  The left DP pulse is 2+  Assign Risk Category:  No deformity present; No loss of protective sensation;  No weak pulses       Risk: 0

## 2019-03-22 DIAGNOSIS — I10 ESSENTIAL HYPERTENSION: ICD-10-CM

## 2019-03-22 DIAGNOSIS — J45.909 ASTHMA, UNSPECIFIED ASTHMA SEVERITY, UNSPECIFIED WHETHER COMPLICATED, UNSPECIFIED WHETHER PERSISTENT: ICD-10-CM

## 2019-03-22 RX ORDER — LISINOPRIL AND HYDROCHLOROTHIAZIDE 25; 20 MG/1; MG/1
TABLET ORAL
Qty: 90 TABLET | Refills: 1 | Status: SHIPPED | OUTPATIENT
Start: 2019-03-22 | End: 2019-09-19 | Stop reason: SDUPTHER

## 2019-03-22 RX ORDER — MONTELUKAST SODIUM 10 MG/1
TABLET ORAL
Qty: 90 TABLET | Refills: 1 | Status: SHIPPED | OUTPATIENT
Start: 2019-03-22 | End: 2019-09-19 | Stop reason: SDUPTHER

## 2019-04-15 DIAGNOSIS — IMO0001 UNCONTROLLED TYPE 2 DIABETES MELLITUS WITHOUT COMPLICATION, WITHOUT LONG-TERM CURRENT USE OF INSULIN: ICD-10-CM

## 2019-04-19 DIAGNOSIS — J45.909 ASTHMA, UNSPECIFIED ASTHMA SEVERITY, UNSPECIFIED WHETHER COMPLICATED, UNSPECIFIED WHETHER PERSISTENT: ICD-10-CM

## 2019-04-19 DIAGNOSIS — IMO0001 UNCONTROLLED TYPE 2 DIABETES MELLITUS WITHOUT COMPLICATION, WITHOUT LONG-TERM CURRENT USE OF INSULIN: ICD-10-CM

## 2019-04-19 RX ORDER — ALBUTEROL SULFATE 90 UG/1
2 AEROSOL, METERED RESPIRATORY (INHALATION) EVERY 4 HOURS PRN
Qty: 8.5 INHALER | Refills: 2 | Status: SHIPPED | OUTPATIENT
Start: 2019-04-19 | End: 2020-01-20

## 2019-04-23 DIAGNOSIS — F41.9 ANXIETY: ICD-10-CM

## 2019-04-25 RX ORDER — ALPRAZOLAM 0.5 MG/1
0.5 TABLET, ORALLY DISINTEGRATING ORAL AS NEEDED
Qty: 15 TABLET | Refills: 0 | Status: SHIPPED | OUTPATIENT
Start: 2019-04-25 | End: 2019-09-19 | Stop reason: SDUPTHER

## 2019-09-03 DIAGNOSIS — T78.2XXD ANAPHYLAXIS, SUBSEQUENT ENCOUNTER: ICD-10-CM

## 2019-09-03 RX ORDER — EPINEPHRINE 0.3 MG/.3ML
0.3 INJECTION SUBCUTANEOUS ONCE
Qty: 2 EACH | Refills: 0 | Status: SHIPPED | OUTPATIENT
Start: 2019-09-03 | End: 2020-08-10 | Stop reason: SDUPTHER

## 2019-09-18 DIAGNOSIS — E78.5 HYPERLIPIDEMIA, UNSPECIFIED HYPERLIPIDEMIA TYPE: ICD-10-CM

## 2019-09-18 DIAGNOSIS — I10 ESSENTIAL HYPERTENSION: ICD-10-CM

## 2019-09-18 DIAGNOSIS — F41.9 ANXIETY: ICD-10-CM

## 2019-09-18 DIAGNOSIS — IMO0001 UNCONTROLLED TYPE 2 DIABETES MELLITUS WITHOUT COMPLICATION, WITHOUT LONG-TERM CURRENT USE OF INSULIN: ICD-10-CM

## 2019-09-18 DIAGNOSIS — J45.909 ASTHMA, UNSPECIFIED ASTHMA SEVERITY, UNSPECIFIED WHETHER COMPLICATED, UNSPECIFIED WHETHER PERSISTENT: ICD-10-CM

## 2019-09-18 RX ORDER — ALPRAZOLAM 0.5 MG/1
0.5 TABLET, ORALLY DISINTEGRATING ORAL AS NEEDED
Qty: 15 TABLET | Refills: 0 | Status: CANCELLED | OUTPATIENT
Start: 2019-09-18

## 2019-09-18 RX ORDER — ATORVASTATIN CALCIUM 20 MG/1
20 TABLET, FILM COATED ORAL DAILY
Qty: 90 TABLET | Refills: 0 | Status: CANCELLED | OUTPATIENT
Start: 2019-09-18

## 2019-09-18 RX ORDER — MONTELUKAST SODIUM 10 MG/1
10 TABLET ORAL DAILY
Qty: 90 TABLET | Refills: 0 | Status: CANCELLED | OUTPATIENT
Start: 2019-09-18

## 2019-09-18 RX ORDER — LISINOPRIL AND HYDROCHLOROTHIAZIDE 25; 20 MG/1; MG/1
1 TABLET ORAL DAILY
Qty: 90 TABLET | Refills: 0 | Status: CANCELLED | OUTPATIENT
Start: 2019-09-18

## 2019-09-19 DIAGNOSIS — F41.9 ANXIETY: ICD-10-CM

## 2019-09-19 DIAGNOSIS — IMO0001 UNCONTROLLED TYPE 2 DIABETES MELLITUS WITHOUT COMPLICATION, WITHOUT LONG-TERM CURRENT USE OF INSULIN: ICD-10-CM

## 2019-09-19 DIAGNOSIS — J45.909 ASTHMA, UNSPECIFIED ASTHMA SEVERITY, UNSPECIFIED WHETHER COMPLICATED, UNSPECIFIED WHETHER PERSISTENT: ICD-10-CM

## 2019-09-19 DIAGNOSIS — I10 ESSENTIAL HYPERTENSION: ICD-10-CM

## 2019-09-19 DIAGNOSIS — E78.5 HYPERLIPIDEMIA, UNSPECIFIED HYPERLIPIDEMIA TYPE: ICD-10-CM

## 2019-09-19 RX ORDER — ATORVASTATIN CALCIUM 20 MG/1
20 TABLET, FILM COATED ORAL DAILY
Qty: 90 TABLET | Refills: 0 | Status: SHIPPED | OUTPATIENT
Start: 2019-09-19 | End: 2019-09-26 | Stop reason: SDUPTHER

## 2019-09-19 RX ORDER — ALPRAZOLAM 0.5 MG/1
0.5 TABLET, ORALLY DISINTEGRATING ORAL AS NEEDED
Qty: 15 TABLET | Refills: 0 | Status: SHIPPED | OUTPATIENT
Start: 2019-09-19 | End: 2020-08-10 | Stop reason: SDUPTHER

## 2019-09-19 RX ORDER — LISINOPRIL AND HYDROCHLOROTHIAZIDE 25; 20 MG/1; MG/1
1 TABLET ORAL DAILY
Qty: 90 TABLET | Refills: 1 | Status: SHIPPED | OUTPATIENT
Start: 2019-09-19 | End: 2019-10-16 | Stop reason: ALTCHOICE

## 2019-09-19 RX ORDER — MONTELUKAST SODIUM 10 MG/1
10 TABLET ORAL DAILY
Qty: 90 TABLET | Refills: 1 | Status: SHIPPED | OUTPATIENT
Start: 2019-09-19 | End: 2020-03-18

## 2019-09-26 DIAGNOSIS — E78.5 HYPERLIPIDEMIA, UNSPECIFIED HYPERLIPIDEMIA TYPE: ICD-10-CM

## 2019-09-26 RX ORDER — ATORVASTATIN CALCIUM 20 MG/1
TABLET, FILM COATED ORAL
Qty: 90 TABLET | Refills: 0 | Status: SHIPPED | OUTPATIENT
Start: 2019-09-26 | End: 2020-03-18

## 2019-10-16 ENCOUNTER — OFFICE VISIT (OUTPATIENT)
Dept: FAMILY MEDICINE CLINIC | Facility: CLINIC | Age: 49
End: 2019-10-16
Payer: COMMERCIAL

## 2019-10-16 VITALS
HEART RATE: 72 BPM | SYSTOLIC BLOOD PRESSURE: 110 MMHG | BODY MASS INDEX: 32.1 KG/M2 | OXYGEN SATURATION: 99 % | HEIGHT: 70 IN | TEMPERATURE: 97.3 F | WEIGHT: 224.2 LBS | DIASTOLIC BLOOD PRESSURE: 68 MMHG

## 2019-10-16 DIAGNOSIS — Z23 NEED FOR INFLUENZA VACCINATION: ICD-10-CM

## 2019-10-16 DIAGNOSIS — E78.2 MIXED HYPERLIPIDEMIA: ICD-10-CM

## 2019-10-16 DIAGNOSIS — E11.9 CONTROLLED TYPE 2 DIABETES MELLITUS WITHOUT COMPLICATION, WITHOUT LONG-TERM CURRENT USE OF INSULIN (HCC): Primary | ICD-10-CM

## 2019-10-16 DIAGNOSIS — I10 ESSENTIAL HYPERTENSION: ICD-10-CM

## 2019-10-16 DIAGNOSIS — J45.909 ASTHMA, UNSPECIFIED ASTHMA SEVERITY, UNSPECIFIED WHETHER COMPLICATED, UNSPECIFIED WHETHER PERSISTENT: ICD-10-CM

## 2019-10-16 LAB
LEFT EYE DIABETIC RETINOPATHY: NORMAL
LEFT EYE IMAGE QUALITY: NORMAL
LEFT EYE MACULAR EDEMA: NORMAL
LEFT EYE OTHER RETINOPATHY: NORMAL
RIGHT EYE DIABETIC RETINOPATHY: NORMAL
RIGHT EYE IMAGE QUALITY: NORMAL
RIGHT EYE MACULAR EDEMA: NORMAL
RIGHT EYE OTHER RETINOPATHY: NORMAL
SEVERITY (EYE EXAM): NORMAL

## 2019-10-16 PROCEDURE — 99214 OFFICE O/P EST MOD 30 MIN: CPT | Performed by: FAMILY MEDICINE

## 2019-10-16 PROCEDURE — 90686 IIV4 VACC NO PRSV 0.5 ML IM: CPT | Performed by: FAMILY MEDICINE

## 2019-10-16 PROCEDURE — 90471 IMMUNIZATION ADMIN: CPT | Performed by: FAMILY MEDICINE

## 2019-10-16 PROCEDURE — 3078F DIAST BP <80 MM HG: CPT | Performed by: FAMILY MEDICINE

## 2019-10-16 PROCEDURE — 3074F SYST BP LT 130 MM HG: CPT | Performed by: FAMILY MEDICINE

## 2019-10-16 PROCEDURE — 92250 FUNDUS PHOTOGRAPHY W/I&R: CPT | Performed by: FAMILY MEDICINE

## 2019-10-16 PROCEDURE — 3008F BODY MASS INDEX DOCD: CPT | Performed by: FAMILY MEDICINE

## 2019-10-16 RX ORDER — OLMESARTAN MEDOXOMIL AND HYDROCHLOROTHIAZIDE 40/12.5 40; 12.5 MG/1; MG/1
1 TABLET ORAL DAILY
Qty: 90 TABLET | Refills: 1 | Status: SHIPPED | OUTPATIENT
Start: 2019-10-16 | End: 2020-03-18

## 2019-10-16 NOTE — PROGRESS NOTES
[facility]      NAME: Samira Whittington  AGE: 51 y o  SEX: male  : 1970   MRN: [id number]    DATE: 10/16/2019  TIME: 10:59 AM    Assessment and Plan     Problem List Items Addressed This Visit     Controlled type 2 diabetes mellitus without complication, without long-term current use of insulin ([facility] [address] ) - Primary    Relevant Orders    IRIS Diabetic eye exam    Comprehensive metabolic panel    Hemoglobin A1c (w/out EAG)    Hyperlipidemia    Relevant Orders    Lipid Panel with Direct LDL reflex    TSH, 3rd generation    Hypertension    Relevant Medications    olmesartan-hydrochlorothiazide (BENICAR HCT) 40-12 5 MG per tablet      Other Visit Diagnoses     Need for influenza vaccination        Relevant Orders    influenza vaccine, 2661-4771, quadrivalent, 0 5 mL, preservative-free, for adult and pediatric patients 6 mos+ (AFLURIA, FLUARIX, FLULAVAL, FLUZONE) (Completed)              Return to office in:  6 months    Chief Complaint     Chief Complaint   Patient presents with    Follow-up     Pt is here for a follow up and bw results  Pt c/o cough due to lisinopril would like to discuss about it  History of Present Illness     Patient presents for routine 6 month follow-up of chronic medical problems  Is being treated for hypertension, hyperlipidemia, asthma and allergic rhinitis  His only concern presently is his chronic cough which she attributes to his lisinopril  He previously had been on Diovan prior to product recall in since his switch to lisinopril has had progressive cough which is severe at times  He takes metformin for his diabetes, atorvastatin for his lipids  He takes alprazolam intermittently for anxiety symptoms  For his allergies and asthma he takes Singulair and Advair inhaler with albuterol for rescue        The following portions of the patient's history were reviewed and updated as appropriate: allergies, current medications, past family history, past medical history, past social history, past surgical history and problem list     Review of Systems   Review of Systems   Constitutional: Negative  Respiratory: Positive for cough  Cardiovascular: Negative  Gastrointestinal: Negative  Genitourinary: Negative  Musculoskeletal: Negative  Psychiatric/Behavioral: Negative  Active Problem List     Patient Active Problem List   Diagnosis    Anxiety    Asthma    Hyperlipidemia    Hypertension    Controlled type 2 diabetes mellitus without complication, without long-term current use of insulin ([facility])       Objective   /68 (BP Location: Left arm, Patient Position: Sitting, Cuff Size: Adult)   Pulse 72   Temp (!) 97 3 °F (36 3 °C) (Tympanic)   Ht 5' 10" (1 778 m)   Wt 102 kg (224 lb 3 2 oz)   SpO2 99%   BMI 32 17 kg/m²     Physical Exam   Constitutional: He is oriented to person, place, and time  He appears well-developed and well-nourished  No distress  HENT:   Head: Normocephalic and atraumatic  Eyes: Pupils are equal, round, and reactive to light  Conjunctivae are normal  Right eye exhibits no discharge  Neck: Normal range of motion  No thyromegaly present  Cardiovascular: Normal rate and regular rhythm  Pulmonary/Chest: Effort normal and breath sounds normal  No respiratory distress  Lymphadenopathy:     He has no cervical adenopathy  Neurological: He is alert and oriented to person, place, and time  Skin: Skin is warm and dry  He is not diaphoretic  Psychiatric: He has a normal mood and affect  His behavior is normal  Judgment and thought content normal    Nursing note and vitals reviewed          Current Medications     Current Outpatient Medications:     albuterol (PROAIR HFA) 90 mcg/act inhaler, Inhale 2 puffs every 6 (six) hours as needed for wheezing, Disp: 3 Inhaler, Rfl: 3    ALPRAZolam (NIRAVAM) 0 5 MG dissolvable tablet, Take 1 tablet (0 5 mg total) by mouth as needed (for flying), Disp: 15 tablet, Rfl: 0    atorvastatin (LIPITOR) 20 mg tablet, TAKE 1 TABLET BY MOUTH EVERY DAY, Disp: 90 tablet, Rfl: 0    cholecalciferol (VITAMIN D3) 1,000 units tablet, Take 1,000 Units by mouth daily, Disp: , Rfl:     EPINEPHrine (EPIPEN 2-ANDRES) 0 3 mg/0 3 mL SOAJ, Inject 0 3 mL (0 3 mg total) into a muscle once for 1 dose, Disp: 2 each, Rfl: 0    fluticasone-salmeterol (ADVAIR DISKUS) 500-50 mcg/dose inhaler, Inhale 1 puff 2 (two) times a day, Disp: 3 Inhaler, Rfl: 3    metFORMIN (GLUCOPHAGE) 500 mg tablet, Take 1 tablet (500 mg total) by mouth daily with breakfast, Disp: 90 tablet, Rfl: 1    montelukast (SINGULAIR) 10 mg tablet, Take 1 tablet (10 mg total) by mouth daily, Disp: 90 tablet, Rfl: 1    multivitamin (THERAGRAN) TABS, Take 1 tablet by mouth daily, Disp: , Rfl:     albuterol (PROVENTIL HFA,VENTOLIN HFA) 90 mcg/act inhaler, INHALE 2 PUFFS EVERY 4 (FOUR) HOURS AS NEEDED FOR WHEEZING OR SHORTNESS OF BREATH (Patient not taking: Reported on 10/16/2019), Disp: 8 5 Inhaler, Rfl: 2    fluticasone-salmeterol (ADVAIR DISKUS) 500-50 mcg/dose inhaler, Inhale 1 puff 2 (two) times a day Rinse mouth after use   (Patient not taking: Reported on 10/16/2019), Disp: 3 Inhaler, Rfl: 1    olmesartan-hydrochlorothiazide (BENICAR HCT) 40-12 5 MG per tablet, Take 1 tablet by mouth daily, Disp: 90 tablet, Rfl: 1    Health Maintenance     Health Maintenance   Topic Date Due    Pneumococcal Vaccine: Pediatrics (0 to 5 Years) and At-Risk Patients (6 to 59 Years) (1 of 1 - PPSV23) 12/23/1975    DM Eye Exam  12/23/1979    BMI: Followup Plan  12/23/1987    HEPATITIS B VACCINES (1 of 3 - Risk 3-dose series) 12/23/1988    DTaP,Tdap,and Td Vaccines (1 - Tdap) 12/23/1990    HEMOGLOBIN A1C  06/07/2019    INFLUENZA VACCINE  07/01/2019    URINE MICROALBUMIN  12/07/2019    BMI: Adult  01/04/2020    Diabetic Foot Exam  01/04/2020    Depression Screening PHQ  10/16/2020    Pneumococcal Vaccine: 65+ Years (1 of 2 - PCV13) 12/23/2034 Immunization History   Administered Date(s) Administered    INFLUENZA 10/30/2018    Influenza Quadrivalent, 6-35 Months IM 10/06/2017    Influenza, injectable, quadrivalent, preservative free 0 5 mL 10/16/2019       Cliff Yoder DO  [facility] Counseling: Body mass index is 32 17 kg/m²  The BMI is above normal  Nutrition recommendations include reducing portion sizes, reducing fast food intake, consuming healthier snacks, moderation in carbohydrate intake and increasing intake of lean protein  Exercise recommendations include moderate aerobic physical activity for 150 minutes/week and exercising 3-5 times per week

## 2019-10-16 NOTE — ASSESSMENT & PLAN NOTE
Lab Results   Component Value Date    HGBA1C 6 7 (H) 12/07/2018   Most recent hemoglobin A1c 6 7/6 months ago  Due for repeat blood work    Continue metformin

## 2019-10-16 NOTE — ASSESSMENT & PLAN NOTE
In light of cough which is progressively worsening will discontinue lisinopril hydrochlorothiazide start patient on olmesartan hydrochlorothiazide 40/12  5

## 2019-10-16 NOTE — ASSESSMENT & PLAN NOTE
Continue atorvastatin for lipid management  Due for blood work  Prescription given to obtain in near future

## 2019-10-16 NOTE — PATIENT INSTRUCTIONS

## 2019-10-16 NOTE — ASSESSMENT & PLAN NOTE
Stable with Singulair and Advair  Only occasional use of rescue inhaler  Continue with current treatment

## 2019-10-17 ENCOUNTER — TELEPHONE (OUTPATIENT)
Dept: FAMILY MEDICINE CLINIC | Facility: CLINIC | Age: 49
End: 2019-10-17

## 2019-10-17 LAB
ALBUMIN SERPL-MCNC: 4.4 G/DL (ref 3.6–5.1)
ALBUMIN/GLOB SERPL: 1.4 (CALC) (ref 1–2.5)
ALP SERPL-CCNC: 53 U/L (ref 40–115)
ALT SERPL-CCNC: 62 U/L (ref 9–46)
AST SERPL-CCNC: 21 U/L (ref 10–40)
BILIRUB SERPL-MCNC: 0.5 MG/DL (ref 0.2–1.2)
BUN SERPL-MCNC: 17 MG/DL (ref 7–25)
BUN/CREAT SERPL: ABNORMAL (CALC) (ref 6–22)
CALCIUM SERPL-MCNC: 9.1 MG/DL (ref 8.6–10.3)
CHLORIDE SERPL-SCNC: 102 MMOL/L (ref 98–110)
CHOLEST SERPL-MCNC: 130 MG/DL
CHOLEST/HDLC SERPL: 2.7 (CALC)
CO2 SERPL-SCNC: 26 MMOL/L (ref 20–32)
CREAT SERPL-MCNC: 0.9 MG/DL (ref 0.6–1.35)
GLOBULIN SER CALC-MCNC: 3.1 G/DL (CALC) (ref 1.9–3.7)
GLUCOSE SERPL-MCNC: 101 MG/DL (ref 65–99)
HBA1C MFR BLD: 5.9 % OF TOTAL HGB
HDLC SERPL-MCNC: 49 MG/DL
LDLC SERPL CALC-MCNC: 64 MG/DL (CALC)
NONHDLC SERPL-MCNC: 81 MG/DL (CALC)
POTASSIUM SERPL-SCNC: 4.2 MMOL/L (ref 3.5–5.3)
PROT SERPL-MCNC: 7.5 G/DL (ref 6.1–8.1)
SL AMB EGFR AFRICAN AMERICAN: 116 ML/MIN/1.73M2
SL AMB EGFR NON AFRICAN AMERICAN: 100 ML/MIN/1.73M2
SODIUM SERPL-SCNC: 139 MMOL/L (ref 135–146)
TRIGL SERPL-MCNC: 83 MG/DL
TSH SERPL-ACNC: 1.38 MIU/L (ref 0.4–4.5)

## 2019-10-17 NOTE — TELEPHONE ENCOUNTER
Please call patient  Blood work shows that hemoglobin A1c is much improved  Has decreased to 5 6 down from 6 7  Cholesterol excellent 130  Chemistry panel does show slight increase in 1 liver enzyme although this is actually improved from his lab work from 1 year ago  Overall very good blood work

## 2019-12-12 LAB
LEFT EYE DIABETIC RETINOPATHY: NORMAL
RIGHT EYE DIABETIC RETINOPATHY: NORMAL

## 2020-01-20 DIAGNOSIS — J45.909 ASTHMA, UNSPECIFIED ASTHMA SEVERITY, UNSPECIFIED WHETHER COMPLICATED, UNSPECIFIED WHETHER PERSISTENT: ICD-10-CM

## 2020-01-20 RX ORDER — ALBUTEROL SULFATE 90 UG/1
2 AEROSOL, METERED RESPIRATORY (INHALATION) EVERY 4 HOURS PRN
Qty: 8.5 INHALER | Refills: 0 | Status: SHIPPED | OUTPATIENT
Start: 2020-01-20 | End: 2020-03-18

## 2020-03-18 DIAGNOSIS — J45.909 ASTHMA, UNSPECIFIED ASTHMA SEVERITY, UNSPECIFIED WHETHER COMPLICATED, UNSPECIFIED WHETHER PERSISTENT: ICD-10-CM

## 2020-03-18 DIAGNOSIS — I10 ESSENTIAL HYPERTENSION: ICD-10-CM

## 2020-03-18 DIAGNOSIS — E78.5 HYPERLIPIDEMIA, UNSPECIFIED HYPERLIPIDEMIA TYPE: ICD-10-CM

## 2020-03-18 RX ORDER — ALBUTEROL SULFATE 90 UG/1
2 AEROSOL, METERED RESPIRATORY (INHALATION) EVERY 4 HOURS PRN
Qty: 8.5 INHALER | Refills: 0 | Status: SHIPPED | OUTPATIENT
Start: 2020-03-18 | End: 2020-04-07

## 2020-03-18 RX ORDER — MONTELUKAST SODIUM 10 MG/1
TABLET ORAL
Qty: 90 TABLET | Refills: 1 | Status: SHIPPED | OUTPATIENT
Start: 2020-03-18 | End: 2020-09-20

## 2020-03-18 RX ORDER — ATORVASTATIN CALCIUM 20 MG/1
TABLET, FILM COATED ORAL
Qty: 90 TABLET | Refills: 0 | Status: SHIPPED | OUTPATIENT
Start: 2020-03-18 | End: 2020-06-12

## 2020-03-18 RX ORDER — OLMESARTAN MEDOXOMIL AND HYDROCHLOROTHIAZIDE 40/12.5 40; 12.5 MG/1; MG/1
TABLET ORAL
Qty: 90 TABLET | Refills: 1 | Status: SHIPPED | OUTPATIENT
Start: 2020-03-18 | End: 2020-11-05

## 2020-04-07 DIAGNOSIS — J45.909 ASTHMA, UNSPECIFIED ASTHMA SEVERITY, UNSPECIFIED WHETHER COMPLICATED, UNSPECIFIED WHETHER PERSISTENT: ICD-10-CM

## 2020-04-07 RX ORDER — ALBUTEROL SULFATE 90 UG/1
2 AEROSOL, METERED RESPIRATORY (INHALATION) EVERY 4 HOURS PRN
Qty: 8.5 INHALER | Refills: 0 | Status: SHIPPED | OUTPATIENT
Start: 2020-04-07 | End: 2020-05-03

## 2020-05-02 DIAGNOSIS — J45.909 ASTHMA, UNSPECIFIED ASTHMA SEVERITY, UNSPECIFIED WHETHER COMPLICATED, UNSPECIFIED WHETHER PERSISTENT: ICD-10-CM

## 2020-05-03 RX ORDER — ALBUTEROL SULFATE 90 UG/1
2 AEROSOL, METERED RESPIRATORY (INHALATION) EVERY 4 HOURS PRN
Qty: 18 INHALER | Refills: 0 | Status: SHIPPED | OUTPATIENT
Start: 2020-05-03 | End: 2020-08-06

## 2020-05-18 ENCOUNTER — TELEMEDICINE (OUTPATIENT)
Dept: FAMILY MEDICINE CLINIC | Facility: CLINIC | Age: 50
End: 2020-05-18
Payer: COMMERCIAL

## 2020-05-18 VITALS — BODY MASS INDEX: 33 KG/M2 | SYSTOLIC BLOOD PRESSURE: 127 MMHG | DIASTOLIC BLOOD PRESSURE: 88 MMHG | WEIGHT: 230 LBS

## 2020-05-18 DIAGNOSIS — Z12.5 SCREENING FOR PROSTATE CANCER: ICD-10-CM

## 2020-05-18 DIAGNOSIS — E78.2 MIXED HYPERLIPIDEMIA: ICD-10-CM

## 2020-05-18 DIAGNOSIS — E11.9 CONTROLLED TYPE 2 DIABETES MELLITUS WITHOUT COMPLICATION, WITHOUT LONG-TERM CURRENT USE OF INSULIN (HCC): ICD-10-CM

## 2020-05-18 DIAGNOSIS — I10 ESSENTIAL HYPERTENSION: ICD-10-CM

## 2020-05-18 DIAGNOSIS — E78.5 HYPERLIPIDEMIA, UNSPECIFIED HYPERLIPIDEMIA TYPE: Primary | ICD-10-CM

## 2020-05-18 PROCEDURE — 99214 OFFICE O/P EST MOD 30 MIN: CPT | Performed by: FAMILY MEDICINE

## 2020-05-18 PROCEDURE — 3079F DIAST BP 80-89 MM HG: CPT | Performed by: FAMILY MEDICINE

## 2020-05-18 PROCEDURE — 3074F SYST BP LT 130 MM HG: CPT | Performed by: FAMILY MEDICINE

## 2020-05-18 PROCEDURE — 1036F TOBACCO NON-USER: CPT | Performed by: FAMILY MEDICINE

## 2020-05-18 PROCEDURE — 2022F DILAT RTA XM EVC RTNOPTHY: CPT | Performed by: FAMILY MEDICINE

## 2020-06-11 DIAGNOSIS — E78.5 HYPERLIPIDEMIA, UNSPECIFIED HYPERLIPIDEMIA TYPE: ICD-10-CM

## 2020-06-12 RX ORDER — ATORVASTATIN CALCIUM 20 MG/1
TABLET, FILM COATED ORAL
Qty: 90 TABLET | Refills: 0 | Status: SHIPPED | OUTPATIENT
Start: 2020-06-12 | End: 2020-09-07

## 2020-08-06 DIAGNOSIS — J45.909 ASTHMA, UNSPECIFIED ASTHMA SEVERITY, UNSPECIFIED WHETHER COMPLICATED, UNSPECIFIED WHETHER PERSISTENT: ICD-10-CM

## 2020-08-06 RX ORDER — ALBUTEROL SULFATE 90 UG/1
2 AEROSOL, METERED RESPIRATORY (INHALATION) EVERY 4 HOURS PRN
Qty: 18 INHALER | Refills: 0 | Status: SHIPPED | OUTPATIENT
Start: 2020-08-06 | End: 2021-03-11 | Stop reason: SDUPTHER

## 2020-08-10 DIAGNOSIS — E13.9 DIABETES 1.5, MANAGED AS TYPE 2 (HCC): ICD-10-CM

## 2020-08-10 DIAGNOSIS — T78.2XXD ANAPHYLAXIS, SUBSEQUENT ENCOUNTER: ICD-10-CM

## 2020-08-10 DIAGNOSIS — F41.9 ANXIETY: ICD-10-CM

## 2020-08-12 RX ORDER — ALPRAZOLAM 0.5 MG/1
0.5 TABLET, ORALLY DISINTEGRATING ORAL AS NEEDED
Qty: 15 TABLET | Refills: 0 | Status: SHIPPED | OUTPATIENT
Start: 2020-08-12 | End: 2021-10-07 | Stop reason: SDUPTHER

## 2020-08-12 RX ORDER — EPINEPHRINE 0.3 MG/.3ML
0.3 INJECTION SUBCUTANEOUS ONCE
Qty: 2 EACH | Refills: 0 | Status: SHIPPED | OUTPATIENT
Start: 2020-08-12 | End: 2021-10-07 | Stop reason: SDUPTHER

## 2020-08-31 ENCOUNTER — OFFICE VISIT (OUTPATIENT)
Dept: FAMILY MEDICINE CLINIC | Facility: CLINIC | Age: 50
End: 2020-08-31
Payer: COMMERCIAL

## 2020-08-31 VITALS
HEIGHT: 70 IN | RESPIRATION RATE: 18 BRPM | SYSTOLIC BLOOD PRESSURE: 126 MMHG | OXYGEN SATURATION: 97 % | DIASTOLIC BLOOD PRESSURE: 82 MMHG | TEMPERATURE: 97.8 F | HEART RATE: 92 BPM | WEIGHT: 243 LBS | BODY MASS INDEX: 34.79 KG/M2

## 2020-08-31 DIAGNOSIS — M79.671 RIGHT FOOT PAIN: Primary | ICD-10-CM

## 2020-08-31 DIAGNOSIS — Z23 NEED FOR DIPHTHERIA-TETANUS-PERTUSSIS (TDAP) VACCINE: ICD-10-CM

## 2020-08-31 PROCEDURE — 3008F BODY MASS INDEX DOCD: CPT | Performed by: FAMILY MEDICINE

## 2020-08-31 PROCEDURE — 90471 IMMUNIZATION ADMIN: CPT | Performed by: FAMILY MEDICINE

## 2020-08-31 PROCEDURE — 99213 OFFICE O/P EST LOW 20 MIN: CPT | Performed by: FAMILY MEDICINE

## 2020-08-31 PROCEDURE — 90715 TDAP VACCINE 7 YRS/> IM: CPT | Performed by: FAMILY MEDICINE

## 2020-08-31 NOTE — PROGRESS NOTES
Assessment/Plan:   1  Right foot pain  Reviewed patient's symptoms today  At this time, it is unclear as to the exact cause of his forefoot discomfort  Small area was noted over his forefoot  This area was probed however there is no foreign body present  At this time, will hold off on checking x-ray imaging  He was advised on importance of soaking his feet multiple times a day in warm water with Epsom salt  He was also advised that if he will be work shoes an outside of his house he should have a bandage with antibiotic ointment present  If he notes any further pain, he was advised to call or follow up  2  Need for diphtheria-tetanus-pertussis (Tdap) vaccine  - TDAP VACCINE GREATER THAN OR EQUAL TO 6YO IM           Diagnoses and all orders for this visit:    Need for diphtheria-tetanus-pertussis (Tdap) vaccine  -     TDAP VACCINE GREATER THAN OR EQUAL TO 6YO IM          Subjective:       Chief Complaint   Patient presents with    Foot Pain     R foot  Pt states he stepped on something while water rafting about a week ago       Patient ID: Bekah Swift is a 47 y o  male  Patient is a 44-year-old male presents today with a CC of discomfort over his right forefoot  He states he has had this pain for about a week  He states that he was white water rafting in after this, he noticed a sharp pain over his forefoot  He noticed a small round pinpoint focal area of discomfort  He denies any redness drainage or bleeding  He does not believe that he stepped on anything  He has tried shaving this area down however has been having persistent pain  Review of Systems   Constitutional: Negative for activity change, chills, fatigue and fever  HENT: Negative for congestion, ear pain, sinus pressure and sore throat  Eyes: Negative for redness, itching and visual disturbance  Respiratory: Negative for cough and shortness of breath  Cardiovascular: Negative for chest pain and palpitations  Gastrointestinal: Negative for abdominal pain, diarrhea and nausea  Endocrine: Negative for cold intolerance and heat intolerance  Genitourinary: Negative for dysuria, flank pain and frequency  Musculoskeletal: Positive for arthralgias  Negative for back pain, gait problem and myalgias  Skin: Negative for color change  Allergic/Immunologic: Negative for environmental allergies  Neurological: Negative for dizziness, numbness and headaches  Psychiatric/Behavioral: Negative for behavioral problems and sleep disturbance  The following portions of the patient's history were reviewed and updated as appropriate : past family history, past medical history, past social history and past surgical history      Current Outpatient Medications:     albuterol (PROAIR HFA) 90 mcg/act inhaler, Inhale 2 puffs every 6 (six) hours as needed for wheezing, Disp: 3 Inhaler, Rfl: 3    albuterol (PROVENTIL HFA,VENTOLIN HFA) 90 mcg/act inhaler, INHALE 2 PUFFS EVERY 4 (FOUR) HOURS AS NEEDED FOR WHEEZING OR SHORTNESS OF BREATH , Disp: 18 Inhaler, Rfl: 0    ALPRAZolam (NIRAVAM) 0 5 MG dissolvable tablet, Take 1 tablet (0 5 mg total) by mouth as needed (for flying), Disp: 15 tablet, Rfl: 0    atorvastatin (LIPITOR) 20 mg tablet, TAKE 1 TABLET BY MOUTH EVERY DAY, Disp: 90 tablet, Rfl: 0    cholecalciferol (VITAMIN D3) 1,000 units tablet, Take 1,000 Units by mouth daily, Disp: , Rfl:     metFORMIN (GLUCOPHAGE) 500 mg tablet, Take 1 tablet (500 mg total) by mouth daily with breakfast, Disp: 90 tablet, Rfl: 0    montelukast (SINGULAIR) 10 mg tablet, TAKE 1 TABLET BY MOUTH EVERY DAY, Disp: 90 tablet, Rfl: 1    multivitamin (THERAGRAN) TABS, Take 1 tablet by mouth daily, Disp: , Rfl:     olmesartan-hydrochlorothiazide (BENICAR HCT) 40-12 5 MG per tablet, TAKE 1 TABLET BY MOUTH EVERY DAY, Disp: 90 tablet, Rfl: 1    EPINEPHrine (EpiPen 2-Drew) 0 3 mg/0 3 mL SOAJ, Inject 0 3 mL (0 3 mg total) into a muscle once for 1 dose, Disp: 2 each, Rfl: 0    fluticasone-salmeterol (ADVAIR DISKUS) 500-50 mcg/dose inhaler, Inhale 1 puff 2 (two) times a day (Patient not taking: Reported on 8/31/2020), Disp: 3 Inhaler, Rfl: 3    fluticasone-salmeterol (ADVAIR DISKUS) 500-50 mcg/dose inhaler, Inhale 1 puff 2 (two) times a day Rinse mouth after use  (Patient not taking: Reported on 10/16/2019), Disp: 3 Inhaler, Rfl: 1         Objective:         Vitals:    08/31/20 1221   BP: 126/82   BP Location: Left arm   Patient Position: Sitting   Cuff Size: Large   Pulse: 92   Resp: 18   Temp: 97 8 °F (36 6 °C)   TempSrc: Temporal   SpO2: 97%   Weight: 110 kg (243 lb)   Height: 5' 10" (1 778 m)     Physical Exam  Vitals signs reviewed  Constitutional:       Appearance: He is well-developed  HENT:      Head: Normocephalic and atraumatic  Nose: Nose normal       Mouth/Throat:      Pharynx: No oropharyngeal exudate  Eyes:      General: No scleral icterus  Right eye: No discharge  Left eye: No discharge  Pupils: Pupils are equal, round, and reactive to light  Neck:      Musculoskeletal: Normal range of motion and neck supple  Trachea: No tracheal deviation  Cardiovascular:      Rate and Rhythm: Normal rate and regular rhythm  Pulses:           Dorsalis pedis pulses are 2+ on the right side and 2+ on the left side  Posterior tibial pulses are 2+ on the right side and 2+ on the left side  Heart sounds: Normal heart sounds  No murmur  No friction rub  No gallop  Pulmonary:      Effort: Pulmonary effort is normal  No respiratory distress  Breath sounds: Normal breath sounds  No wheezing or rales  Abdominal:      General: Bowel sounds are normal  There is no distension  Palpations: Abdomen is soft  Tenderness: There is no abdominal tenderness  There is no guarding or rebound  Musculoskeletal: Normal range of motion     Lymphadenopathy:      Head:      Right side of head: No submental or submandibular adenopathy  Left side of head: No submental or submandibular adenopathy  Cervical: No cervical adenopathy  Right cervical: No superficial, deep or posterior cervical adenopathy  Left cervical: No superficial, deep or posterior cervical adenopathy  Skin:     General: Skin is warm and dry  Findings: No erythema  Neurological:      Mental Status: He is alert and oriented to person, place, and time  Cranial Nerves: No cranial nerve deficit  Sensory: No sensory deficit  Psychiatric:         Mood and Affect: Mood is not anxious or depressed  Speech: Speech normal          Behavior: Behavior normal          Thought Content:  Thought content normal          Judgment: Judgment normal

## 2020-09-05 DIAGNOSIS — E78.5 HYPERLIPIDEMIA, UNSPECIFIED HYPERLIPIDEMIA TYPE: ICD-10-CM

## 2020-09-07 RX ORDER — ATORVASTATIN CALCIUM 20 MG/1
TABLET, FILM COATED ORAL
Qty: 90 TABLET | Refills: 0 | Status: SHIPPED | OUTPATIENT
Start: 2020-09-07 | End: 2020-11-18 | Stop reason: SDUPTHER

## 2020-09-18 DIAGNOSIS — J45.909 ASTHMA, UNSPECIFIED ASTHMA SEVERITY, UNSPECIFIED WHETHER COMPLICATED, UNSPECIFIED WHETHER PERSISTENT: ICD-10-CM

## 2020-09-20 RX ORDER — MONTELUKAST SODIUM 10 MG/1
TABLET ORAL
Qty: 90 TABLET | Refills: 1 | Status: SHIPPED | OUTPATIENT
Start: 2020-09-20 | End: 2021-03-10

## 2020-11-05 DIAGNOSIS — I10 ESSENTIAL HYPERTENSION: ICD-10-CM

## 2020-11-05 RX ORDER — OLMESARTAN MEDOXOMIL AND HYDROCHLOROTHIAZIDE 40/12.5 40; 12.5 MG/1; MG/1
TABLET ORAL
Qty: 90 TABLET | Refills: 1 | Status: SHIPPED | OUTPATIENT
Start: 2020-11-05 | End: 2021-05-11

## 2020-11-18 ENCOUNTER — OFFICE VISIT (OUTPATIENT)
Dept: FAMILY MEDICINE CLINIC | Facility: CLINIC | Age: 50
End: 2020-11-18
Payer: COMMERCIAL

## 2020-11-18 VITALS
BODY MASS INDEX: 35.02 KG/M2 | HEIGHT: 70 IN | OXYGEN SATURATION: 98 % | DIASTOLIC BLOOD PRESSURE: 88 MMHG | TEMPERATURE: 97.5 F | WEIGHT: 244.6 LBS | SYSTOLIC BLOOD PRESSURE: 142 MMHG | HEART RATE: 88 BPM

## 2020-11-18 DIAGNOSIS — E78.5 HYPERLIPIDEMIA, UNSPECIFIED HYPERLIPIDEMIA TYPE: ICD-10-CM

## 2020-11-18 DIAGNOSIS — E13.9 DIABETES 1.5, MANAGED AS TYPE 2 (HCC): ICD-10-CM

## 2020-11-18 DIAGNOSIS — Z12.11 SCREEN FOR COLON CANCER: ICD-10-CM

## 2020-11-18 DIAGNOSIS — G47.33 OBSTRUCTIVE SLEEP APNEA SYNDROME: ICD-10-CM

## 2020-11-18 DIAGNOSIS — J45.909 ASTHMA, UNSPECIFIED ASTHMA SEVERITY, UNSPECIFIED WHETHER COMPLICATED, UNSPECIFIED WHETHER PERSISTENT: ICD-10-CM

## 2020-11-18 DIAGNOSIS — Z23 NEED FOR INFLUENZA VACCINATION: Primary | ICD-10-CM

## 2020-11-18 PROCEDURE — 99214 OFFICE O/P EST MOD 30 MIN: CPT | Performed by: FAMILY MEDICINE

## 2020-11-18 PROCEDURE — 3079F DIAST BP 80-89 MM HG: CPT | Performed by: FAMILY MEDICINE

## 2020-11-18 PROCEDURE — 3008F BODY MASS INDEX DOCD: CPT | Performed by: FAMILY MEDICINE

## 2020-11-18 PROCEDURE — 3077F SYST BP >= 140 MM HG: CPT | Performed by: FAMILY MEDICINE

## 2020-11-18 PROCEDURE — 90471 IMMUNIZATION ADMIN: CPT | Performed by: FAMILY MEDICINE

## 2020-11-18 PROCEDURE — 1036F TOBACCO NON-USER: CPT | Performed by: FAMILY MEDICINE

## 2020-11-18 PROCEDURE — 90682 RIV4 VACC RECOMBINANT DNA IM: CPT | Performed by: FAMILY MEDICINE

## 2020-11-18 PROCEDURE — 3725F SCREEN DEPRESSION PERFORMED: CPT | Performed by: FAMILY MEDICINE

## 2020-11-18 RX ORDER — ATORVASTATIN CALCIUM 20 MG/1
20 TABLET, FILM COATED ORAL DAILY
Qty: 90 TABLET | Refills: 1 | Status: SHIPPED | OUTPATIENT
Start: 2020-11-18 | End: 2021-05-17

## 2020-11-19 LAB
ALBUMIN SERPL-MCNC: 4.7 G/DL (ref 3.6–5.1)
ALBUMIN/GLOB SERPL: 1.5 (CALC) (ref 1–2.5)
ALP SERPL-CCNC: 59 U/L (ref 35–144)
ALT SERPL-CCNC: 86 U/L (ref 9–46)
AST SERPL-CCNC: 36 U/L (ref 10–35)
BILIRUB SERPL-MCNC: 0.7 MG/DL (ref 0.2–1.2)
BUN SERPL-MCNC: 13 MG/DL (ref 7–25)
BUN/CREAT SERPL: ABNORMAL (CALC) (ref 6–22)
CALCIUM SERPL-MCNC: 9.7 MG/DL (ref 8.6–10.3)
CHLORIDE SERPL-SCNC: 99 MMOL/L (ref 98–110)
CHOLEST SERPL-MCNC: 139 MG/DL
CHOLEST/HDLC SERPL: 4.1 (CALC)
CO2 SERPL-SCNC: 26 MMOL/L (ref 20–32)
CREAT SERPL-MCNC: 1.02 MG/DL (ref 0.7–1.33)
GLOBULIN SER CALC-MCNC: 3.2 G/DL (CALC) (ref 1.9–3.7)
GLUCOSE SERPL-MCNC: 147 MG/DL (ref 65–99)
HBA1C MFR BLD: 7.4 % OF TOTAL HGB
HDLC SERPL-MCNC: 34 MG/DL
LDLC SERPL CALC-MCNC: 78 MG/DL (CALC)
NONHDLC SERPL-MCNC: 105 MG/DL (CALC)
POTASSIUM SERPL-SCNC: 3.7 MMOL/L (ref 3.5–5.3)
PROT SERPL-MCNC: 7.9 G/DL (ref 6.1–8.1)
PSA SERPL-MCNC: 0.6 NG/ML
SL AMB EGFR AFRICAN AMERICAN: 99 ML/MIN/1.73M2
SL AMB EGFR NON AFRICAN AMERICAN: 85 ML/MIN/1.73M2
SODIUM SERPL-SCNC: 137 MMOL/L (ref 135–146)
TRIGL SERPL-MCNC: 177 MG/DL
TSH SERPL-ACNC: 1.62 MIU/L (ref 0.4–4.5)

## 2020-11-19 PROCEDURE — 3051F HG A1C>EQUAL 7.0%<8.0%: CPT | Performed by: FAMILY MEDICINE

## 2020-11-20 ENCOUNTER — TELEPHONE (OUTPATIENT)
Dept: GASTROENTEROLOGY | Facility: CLINIC | Age: 50
End: 2020-11-20

## 2020-11-20 DIAGNOSIS — E13.9 DIABETES 1.5, MANAGED AS TYPE 2 (HCC): ICD-10-CM

## 2020-11-25 ENCOUNTER — TELEPHONE (OUTPATIENT)
Dept: FAMILY MEDICINE CLINIC | Facility: CLINIC | Age: 50
End: 2020-11-25

## 2020-12-09 ENCOUNTER — TELEPHONE (OUTPATIENT)
Dept: SLEEP CENTER | Facility: CLINIC | Age: 50
End: 2020-12-09

## 2020-12-09 ENCOUNTER — OFFICE VISIT (OUTPATIENT)
Dept: GASTROENTEROLOGY | Facility: MEDICAL CENTER | Age: 50
End: 2020-12-09
Payer: COMMERCIAL

## 2020-12-09 VITALS
DIASTOLIC BLOOD PRESSURE: 84 MMHG | WEIGHT: 250 LBS | HEIGHT: 70 IN | SYSTOLIC BLOOD PRESSURE: 127 MMHG | HEART RATE: 73 BPM | BODY MASS INDEX: 35.79 KG/M2 | TEMPERATURE: 98.3 F

## 2020-12-09 DIAGNOSIS — Z12.11 SCREEN FOR COLON CANCER: ICD-10-CM

## 2020-12-09 PROCEDURE — 3079F DIAST BP 80-89 MM HG: CPT | Performed by: INTERNAL MEDICINE

## 2020-12-09 PROCEDURE — 99203 OFFICE O/P NEW LOW 30 MIN: CPT | Performed by: INTERNAL MEDICINE

## 2020-12-09 PROCEDURE — 1036F TOBACCO NON-USER: CPT | Performed by: INTERNAL MEDICINE

## 2020-12-09 PROCEDURE — 3074F SYST BP LT 130 MM HG: CPT | Performed by: INTERNAL MEDICINE

## 2020-12-09 PROCEDURE — 3008F BODY MASS INDEX DOCD: CPT | Performed by: INTERNAL MEDICINE

## 2020-12-09 RX ORDER — SODIUM, POTASSIUM,MAG SULFATES 17.5-3.13G
1 SOLUTION, RECONSTITUTED, ORAL ORAL ONCE
Qty: 1 BOTTLE | Refills: 0 | Status: SHIPPED | OUTPATIENT
Start: 2020-12-09 | End: 2021-02-18 | Stop reason: HOSPADM

## 2020-12-10 ENCOUNTER — HOSPITAL ENCOUNTER (OUTPATIENT)
Dept: SLEEP CENTER | Facility: CLINIC | Age: 50
Discharge: HOME | End: 2020-12-10
Payer: COMMERCIAL

## 2020-12-10 DIAGNOSIS — G47.33 OBSTRUCTIVE SLEEP APNEA SYNDROME: ICD-10-CM

## 2020-12-10 PROCEDURE — G0399 HOME SLEEP TEST/TYPE 3 PORTA: HCPCS

## 2020-12-14 ENCOUNTER — ANESTHESIA EVENT (OUTPATIENT)
Dept: GASTROENTEROLOGY | Facility: MEDICAL CENTER | Age: 50
End: 2020-12-14

## 2020-12-16 ENCOUNTER — TELEPHONE (OUTPATIENT)
Dept: SLEEP CENTER | Facility: CLINIC | Age: 50
End: 2020-12-16

## 2021-01-29 ENCOUNTER — OFFICE VISIT (OUTPATIENT)
Dept: SLEEP CENTER | Facility: CLINIC | Age: 51
End: 2021-01-29
Payer: COMMERCIAL

## 2021-01-29 VITALS
SYSTOLIC BLOOD PRESSURE: 118 MMHG | BODY MASS INDEX: 35.3 KG/M2 | WEIGHT: 246.6 LBS | HEIGHT: 70 IN | DIASTOLIC BLOOD PRESSURE: 80 MMHG | HEART RATE: 87 BPM

## 2021-01-29 DIAGNOSIS — E66.9 OBESITY (BMI 30-39.9): ICD-10-CM

## 2021-01-29 DIAGNOSIS — F41.9 ANXIETY: ICD-10-CM

## 2021-01-29 DIAGNOSIS — J45.909 ASTHMA, UNSPECIFIED ASTHMA SEVERITY, UNSPECIFIED WHETHER COMPLICATED, UNSPECIFIED WHETHER PERSISTENT: ICD-10-CM

## 2021-01-29 DIAGNOSIS — F40.240 CLAUSTROPHOBIA: ICD-10-CM

## 2021-01-29 DIAGNOSIS — E11.9 CONTROLLED TYPE 2 DIABETES MELLITUS WITHOUT COMPLICATION, WITHOUT LONG-TERM CURRENT USE OF INSULIN (HCC): ICD-10-CM

## 2021-01-29 DIAGNOSIS — G47.9 SLEEP DISTURBANCE: ICD-10-CM

## 2021-01-29 DIAGNOSIS — G47.33 OBSTRUCTIVE SLEEP APNEA SYNDROME: Primary | ICD-10-CM

## 2021-01-29 DIAGNOSIS — I10 ESSENTIAL HYPERTENSION: ICD-10-CM

## 2021-01-29 PROCEDURE — 99204 OFFICE O/P NEW MOD 45 MIN: CPT | Performed by: INTERNAL MEDICINE

## 2021-01-29 PROCEDURE — 3008F BODY MASS INDEX DOCD: CPT | Performed by: INTERNAL MEDICINE

## 2021-01-29 RX ORDER — ZOLPIDEM TARTRATE 5 MG/1
5 TABLET ORAL AS NEEDED
Qty: 1 TABLET | Refills: 0 | Status: SHIPPED | OUTPATIENT
Start: 2021-01-29 | End: 2021-11-15 | Stop reason: ALTCHOICE

## 2021-01-29 NOTE — PROGRESS NOTES
Consultation - Sleep Center   Samira Whittington  45 y o  male  :1970  [facility]:[phone]    Physician Requesting Consult: Robert Nicole DO    Reason for Consult : At your kind request I saw Samira Whittington for initial sleep evaluation today  Home sleep testing was undertaken to evaluate for sleep disordered breathing and patient is here to review results and further options  The study demonstrated : GREYSON (respiratory event index of) 26 8 /hour  Minimum oxygen saturation 81%  and 6 1% of total sleep time was spent with saturations less than 90%  The snore index was 9 1%  PFSH, Problem List, Medications & Allergies were reviewed in EMR  Maynor Young  has a past medical history of Lumbosacral ligament sprain (2013) and Obstructive sleep apnea  He has a current medication list which includes the following prescription(s): albuterol, albuterol, alprazolam, atorvastatin, cholecalciferol, fluticasone-salmeterol, metformin, montelukast, multivitamin, olmesartan-hydrochlorothiazide, epinephrine, suprep bowel prep kit, and zolpidem  HPI:  Study was undertaken because of recurrence of symptoms of sleep disordered breathing  He was diagnosed with obstructive sleep apnea in  and prescribed CPAP  He discontinued use because of intolerance  He had upper airway surgery with good results but symptoms have recurred and escalated over the past 5 years in association with weight gain  He reports loud snoring that disturbs others wave witnessed apneas  He awakens himself with choking or gasping and acid reflux  Other Complaints: none  Restless Leg Syndrome: reports no suggestive symptoms    Parasomnia: no features reported    Sleep Routine (averages): Typical Bedtime:  11:00 p m  Gets OOB:  7:00 a m  TIB:8 hrs  Sleep latency:<  30 minutes Sleep Interruptions:3-4/nite and is usually able to fall back asleep    At times he uses over-the-counter sleep aids or alcohol to assist with sleep  Awakens: before alarm most days  Upon awakening: usually feels refreshed  He estimates getting ? hrs sleep  Excessive Daytime Sleepiness denied  [place] Sleepiness Scale rated at Total score: 7 /24  Habits:  reports that he has never smoked  He has never used smokeless tobacco  ;   E-Cigarette/Vaping    ;  reports no history of drug use ;  reports current alcohol use of about 1 0 - 2 0 standard drinks of alcohol per week  ; Caffeine use:limited ; Exercise routine: none   Family History:  Father has obstructive sleep apnea  ROS - see attached  Significant for weight increase over the years  He reported no nasal symptoms  He has shortness of breath, chest tightness and wheezing  [place] EXAM:  /80   Pulse 87   Ht 5' 9 5" (1 765 m)   Wt 112 kg (246 lb 9 6 oz)   BMI 35 89 kg/m²    General: Well groomed male, well appearing, in no apparent distress  Psychiatric: Alert and orientated; Cooperative; Speech: Pressured; appears anxious, otherwise Normal mood, affect & thought   HEENT:  Craniofacial anatomy: normal Sinuses: non- tender  TMJ: Normal    Eyes: EOM's intact;  conjunctiva/corneas clear   Ears: Externallyappear normal     Nasal Airway: is patent and narrow nares Septum:intact; Mucosa: normal; Turbinates: normal; Rhinorrhea: None   Mouth: Lips: normal posture; Dentition: normal   Mucosa:moist  ; Hard Palate:narrow and high arched   Oropharryx: crowded and AP narrowing Tongue: Mallampati:Class IV, Mobile and MacroglossiaSoft Palate:  redundant  and s/p UP3 Tonsils: cryptic  Neck:is thick and excess fatty tissue; Neck Circumference: 17 5 "; Supple; no abnormal masses; Thyroid:normal  Trachea:central     Lymph: No Cervical or Submandibular Lymhadenopathy  Heart: S1,S2 normal; RRR; no gallop; nomurmurs   Lungs: Respiratory Effort:normal  Air entry good bilaterally  No wheezes  No rales  Abdomen: Obese, Soft & non-tender    Extremities: No pedal edema  No clubbing or cyanosis  Skin: warm and dry; Color& Hydration good; no facial rashes or lesions   Neurological:  Cranial nerves intact; Motor normal; Sensation normal  Musculoskeletal: Muscle bulk, tone and power WNL Gait:normal        IMPRESSION: Primary, Secondary Sleep Diagnoses (to Medical or Psych conditions) & Comorbidities   1  Obstructive sleep apnea syndrome  CPAP Study   2  Sleep disturbance  zolpidem (AMBIEN) 5 mg tablet   3  Anxiety     4  Claustrophobia     5  Asthma, unspecified asthma severity, unspecified whether complicated, unspecified whether persistent  CPAP Study   6  Obesity (BMI 30-39 9)     7  Essential hypertension     8  Controlled type 2 diabetes mellitus without complication, without long-term current use of insulin ([facility] )        PLAN:   1  I reviewed results of the Sleep study with the patient  2  With respect to above conditions, I counseled on pathophysiology, diagnosis, treatment options, risks and benefits; inter-relationship and effects on symptoms and comorbidities; risks of no treatment; costs and insurance aspects  3  Patient elected positive airway pressure therapy and is to be scheduled for a titration study  Because of claustrophobia, I gave him a prescription for Ambien to be used on the study night  4  Cognitive behavioral therapy was initiated, Sleep Hygiene and behavioral techniques to manage Insomnia were discussed  5  He may need adjusting of his inhalers  6  I also advised on weight reduction  7  Follow-up to be scheduled after the study to review results and to initiate therapy  He will likely need formal desensitization          Sincerely,     Authenticated electronically by Venita Arceo MD   on 54/91/61   Board Certified Specialist

## 2021-01-29 NOTE — PATIENT INSTRUCTIONS
What is ROSA MARIA? Obstructive sleep apnea is a common and serious sleep disorder that causes you to stop breathing during sleep  The airway repeatedly becomes blocked, limiting the amount of air that reaches your lungs  When this happens, you may snore loudly or making choking noises as you try to breathe  Your brain and body becomes oxygen deprived and you may wake up  This may happen a few times a night, or in more severe cases, several hundred times a night  Sleep apnea can make you wake up in the morning feeling tired or unrefreshed even though you have had a full night of sleep  During the day, you may feel fatigued, have difficulty concentrating or you may even unintentionally fall asleep  This is because your body is waking up numerous times throughout the night, even though you might not be conscious of each awakening  The lack of oxygen your body receives can have negative long-term consequences for your health  This includes:  High blood pressure  Heart disease  Irregular heart rhythms  Stroke  Pre-diabetes and diabetes  Depression    Testing  An objective evaluation of your sleep may be needed before your board certified sleep physician can make a diagnosis  Options include:   In-lab overnight sleep study  This type of sleep study requires you to stay overnight at a sleep center, in a bed that may resemble a hotel room  You will sleep with sensors hooked up to various parts of your body  These sensors record your brain waves, heartbeat, breathing and movement  An overnight sleep study also provides your doctor with the most complete information about your sleep  Learn more about an overnight sleep study      Home sleep apnea test  Some patients with high risk factors for obstructive sleep apnea and no other medical disorders may be candidates for a home sleep apnea test  The testing equipment differs in that it is less complicated than what is used in an overnight sleep study   As such, does not give all the data an in-lab will and if negative, may not mean you do not have the problem  Treatment for sleep apnea  includes using a continuous positive airway pressure (CPAP) machine to keep your airway open during sleep  A mask is placed over your nose and mouth, or just your nose  The mask is hooked to the CPAP machine that blows a gentle stream of air into the mask when you breathe  This helps keep your airway open so you can breathe more regularly  Extra oxygen may be given to you through the machine  You may be given a mouth device  It looks like a mouth guard or dental retainer and stops your tongue and mouth tissues from blocking your throat while you sleep  Surgery may be needed to remove extra tissues that block your mouth, throat, or nose  Manage sleep apnea:   Do not smoke  Nicotine and other chemicals in cigarettes and cigars can cause lung damage  Ask your healthcare provider for information if you currently smoke and need help to quit  E-cigarettes or smokeless tobacco still contain nicotine  Talk to your healthcare provider before you use these products  Do not drink alcohol or take sedative medicine before you go to sleep  Alcohol and sedatives can relax the muscles and tissues around your throat  This can block the airflow to your lungs  Maintain a healthy weight  Excess tissue around your throat may restrict your breathing  Ask your healthcare provider for information if you need to lose weight  Sleep on your side or use pillows designed to prevent sleep apnea  This prevents your tongue or other tissues from blocking your throat  You can also raise the head of your bed  Driving Safety  Refrain from driving when drowsy  Follow up with your healthcare provider as directed:  Write down your questions so you remember to ask them during your visits  Go to AASM website for more information: Sleepeducation  org     What is ROSA MARIA?    Obstructive sleep apnea is a common and serious sleep disorder that causes you to stop breathing during sleep  The airway repeatedly becomes blocked, limiting the amount of air that reaches your lungs  When this happens, you may snore loudly or making choking noises as you try to breathe  Your brain and body becomes oxygen deprived and you may wake up  This may happen a few times a night, or in more severe cases, several hundred times a night  Sleep apnea can make you wake up in the morning feeling tired or unrefreshed even though you have had a full night of sleep  During the day, you may feel fatigued, have difficulty concentrating or you may even unintentionally fall asleep  This is because your body is waking up numerous times throughout the night, even though you might not be conscious of each awakening  The lack of oxygen your body receives can have negative long-term consequences for your health  This includes:  High blood pressure  Heart disease  Irregular heart rhythms  Stroke  Pre-diabetes and diabetes  Depression    Testing  An objective evaluation of your sleep may be needed before your board certified sleep physician can make a diagnosis  Options include:   In-lab overnight sleep study  This type of sleep study requires you to stay overnight at a sleep center, in a bed that may resemble a hotel room  You will sleep with sensors hooked up to various parts of your body  These sensors record your brain waves, heartbeat, breathing and movement  An overnight sleep study also provides your doctor with the most complete information about your sleep  Learn more about an overnight sleep study      Home sleep apnea test  Some patients with high risk factors for obstructive sleep apnea and no other medical disorders may be candidates for a home sleep apnea test  The testing equipment differs in that it is less complicated than what is used in an overnight sleep study   As such, does not give all the data an in-lab will and if negative, may not mean you do not have the problem  Treatment for sleep apnea  includes using a continuous positive airway pressure (CPAP) machine to keep your airway open during sleep  A mask is placed over your nose and mouth, or just your nose  The mask is hooked to the CPAP machine that blows a gentle stream of air into the mask when you breathe  This helps keep your airway open so you can breathe more regularly  Extra oxygen may be given to you through the machine  You may be given a mouth device  It looks like a mouth guard or dental retainer and stops your tongue and mouth tissues from blocking your throat while you sleep  Surgery may be needed to remove extra tissues that block your mouth, throat, or nose  Manage sleep apnea:   Do not smoke  Nicotine and other chemicals in cigarettes and cigars can cause lung damage  Ask your healthcare provider for information if you currently smoke and need help to quit  E-cigarettes or smokeless tobacco still contain nicotine  Talk to your healthcare provider before you use these products  Do not drink alcohol or take sedative medicine before you go to sleep  Alcohol and sedatives can relax the muscles and tissues around your throat  This can block the airflow to your lungs  Maintain a healthy weight  Excess tissue around your throat may restrict your breathing  Ask your healthcare provider for information if you need to lose weight  Sleep on your side or use pillows designed to prevent sleep apnea  This prevents your tongue or other tissues from blocking your throat  You can also raise the head of your bed  Driving Safety  Refrain from driving when drowsy  Follow up with your healthcare provider as directed:  Write down your questions so you remember to ask them during your visits  Go to AAS website for more information: Sleepeducation  org           Nursing Support:  When: Monday through Friday 7A-5PM except holidays  Where: Our direct line is [phone]      If you are having a true emergency please call 911  In the event that the line is busy or it is after hours please leave a voice message and we will return your call  Please speak clearly, leaving your full name, birth date, best number to reach you and the reason for your call  Medication refills: We will need the name of the medication, the dosage, the ordering provider, whether you get a 30 or 90 day refill, and the pharmacy name and address  Medications will be ordered by the provider only  Nurses cannot call in prescriptions  Please allow 7 days for medication refills  Physician requested updates: If your provider requested that you call with an update after starting medication, please be ready to provide us the medication and dosage, what time you take your medication, the time you attempt to fall asleep, time you fall asleep, when you wake up, and what time you get out of bed  Sleep Study Results: We will contact you with sleep study results and/or next steps after the physician has reviewed your testing

## 2021-01-29 NOTE — PROGRESS NOTES
Review of Systems      Genitourinary need to urinate more than twice a night   Cardiology none   Gastrointestinal none   Neurology awaken with headache   Constitutional weight change   Integumentary none   Psychiatry anxiety   Musculoskeletal back pain   Pulmonary shortness of breath with activity, chest tightness, wheezing and snoring   ENT none   Endocrine frequent urination   Hematological none

## 2021-02-03 ENCOUNTER — TELEPHONE (OUTPATIENT)
Dept: SLEEP CENTER | Facility: CLINIC | Age: 51
End: 2021-02-03

## 2021-02-03 DIAGNOSIS — Z20.822 ENCOUNTER FOR PREPROCEDURE SCREENING LABORATORY TESTING FOR COVID-19: Primary | ICD-10-CM

## 2021-02-03 DIAGNOSIS — Z01.812 ENCOUNTER FOR PREPROCEDURE SCREENING LABORATORY TESTING FOR COVID-19: Primary | ICD-10-CM

## 2021-02-12 DIAGNOSIS — Z01.812 ENCOUNTER FOR PREPROCEDURE SCREENING LABORATORY TESTING FOR COVID-19: ICD-10-CM

## 2021-02-12 DIAGNOSIS — Z20.822 ENCOUNTER FOR PREPROCEDURE SCREENING LABORATORY TESTING FOR COVID-19: ICD-10-CM

## 2021-02-12 PROCEDURE — U0005 INFEC AGEN DETEC AMPLI PROBE: HCPCS | Performed by: INTERNAL MEDICINE

## 2021-02-12 PROCEDURE — U0003 INFECTIOUS AGENT DETECTION BY NUCLEIC ACID (DNA OR RNA); SEVERE ACUTE RESPIRATORY SYNDROME CORONAVIRUS 2 (SARS-COV-2) (CORONAVIRUS DISEASE [COVID-19]), AMPLIFIED PROBE TECHNIQUE, MAKING USE OF HIGH THROUGHPUT TECHNOLOGIES AS DESCRIBED BY CMS-2020-01-R: HCPCS | Performed by: INTERNAL MEDICINE

## 2021-02-14 LAB — SARS-COV-2 RNA RESP QL NAA+PROBE: NEGATIVE

## 2021-02-16 ENCOUNTER — HOSPITAL ENCOUNTER (OUTPATIENT)
Dept: SLEEP CENTER | Facility: CLINIC | Age: 51
Discharge: HOME | End: 2021-02-16
Payer: COMMERCIAL

## 2021-02-16 DIAGNOSIS — G47.33 OBSTRUCTIVE SLEEP APNEA SYNDROME: ICD-10-CM

## 2021-02-16 DIAGNOSIS — J45.909 ASTHMA, UNSPECIFIED ASTHMA SEVERITY, UNSPECIFIED WHETHER COMPLICATED, UNSPECIFIED WHETHER PERSISTENT: ICD-10-CM

## 2021-02-16 PROCEDURE — 95811 POLYSOM 6/>YRS CPAP 4/> PARM: CPT

## 2021-02-17 DIAGNOSIS — G47.33 OSA (OBSTRUCTIVE SLEEP APNEA): Primary | ICD-10-CM

## 2021-02-17 NOTE — ANESTHESIA PREPROCEDURE EVALUATION
Procedure:  COLONOSCOPY    Relevant Problems   ANESTHESIA (within normal limits)      CARDIO   (+) Hyperlipidemia   (+) Hypertension      ENDO   (+) Controlled type 2 diabetes mellitus without complication, without long-term current use of insulin (HCC)      GI/HEPATIC (within normal limits)      /RENAL (within normal limits)      GYN (within normal limits)      HEMATOLOGY (within normal limits)      MUSCULOSKELETAL (within normal limits)      NEURO/PSYCH   (+) Anxiety      PULMONARY   (+) Asthma   (+) Obstructive sleep apnea syndrome        Physical Exam    Airway  Comment: Patient had UPPP in the past  Mallampati score: III  TM Distance: >3 FB  Neck ROM: full     Dental   No notable dental hx     Cardiovascular  Rhythm: regular, Rate: normal, Cardiovascular exam normal    Pulmonary  Pulmonary exam normal     Other Findings        Anesthesia Plan  ASA Score- 2     Anesthesia Type- IV sedation with anesthesia with ASA Monitors  Additional Monitors:   Airway Plan:           Plan Factors-Exercise tolerance (METS): >4 METS  Chart reviewed  Patient summary reviewed  Patient is a current smoker  Patient not instructed to abstain from smoking on day of procedure  Patient did not smoke on day of surgery  Induction- intravenous  Postoperative Plan-     Informed Consent- Anesthetic plan and risks discussed with patient

## 2021-02-17 NOTE — PROGRESS NOTES
Sleep Study Documentation    Pre-Sleep Study       Sleep testing procedure explained to patient:YES    Patient napped prior to study:NO    [address] [place] worker after 12PM   Caffeine use:NO    Alcohol:Dayshift workers after 5PM: Alcohol use:NO    Typical day for patient:NO       Study Documentation    Sleep Study Indications:  ROSA MARIA-diagnosed home study at [address] [facility]  Sleep Study: Treatment   Optimal PAP pressure: 10 cm H2O  Leak:Medium  Snore:Eliminated  REM Obtained:yes  Supplemental O2: no    Minimum SaO2  88 %  Baseline SaO2  94 6 %  PAP mask tried (list all) Grey Rahman Eson 2 Large Nasal Mask with no humidity  PAP mask choice (final) Same  PAP mask type:nasal  PAP pressure at which snoring was eliminated  9 cm H2O  Minimum SaO2 at final PAP pressure  91 %  Mode of Therapy:CPAP  ETCO2:No  CPAP changed to BiPAP:No    Mode of Therapy:CPAP    EKG abnormalities: no     EEG abnormalities: yes:  ECG artifact throughout study  Averaged Ms  Sleep Study Recorded < 2 hours: N/A    Sleep Study Recorded > 2 hours but incomplete study: N/A    Sleep Study Recorded 6 hours but no sleep obtained: NO          Post-Sleep Study    Medication used at bedtime or during sleep study:YES prescription sleep aid    Patient reports time it took to fall asleep:20 to 30 minutes    Patient reports waking up during study:3 or more times  Patient reports returning to sleep without difficulty  Patient reports sleeping 4 to 6 hours without dreaming  Patient reports sleep during study:better than usual    Patient rated sleepiness: Somewhat sleepy or tired    PAP treatment:yes: Post PAP treatment patient reports feeling better and  would wear PAP mask at home

## 2021-02-18 ENCOUNTER — HOSPITAL ENCOUNTER (OUTPATIENT)
Dept: GASTROENTEROLOGY | Facility: MEDICAL CENTER | Age: 51
Setting detail: OUTPATIENT SURGERY
Discharge: HOME | End: 2021-02-18
Attending: INTERNAL MEDICINE | Admitting: INTERNAL MEDICINE
Payer: COMMERCIAL

## 2021-02-18 ENCOUNTER — ANESTHESIA (OUTPATIENT)
Dept: GASTROENTEROLOGY | Facility: MEDICAL CENTER | Age: 51
End: 2021-02-18

## 2021-02-18 ENCOUNTER — TELEPHONE (OUTPATIENT)
Dept: SLEEP CENTER | Facility: CLINIC | Age: 51
End: 2021-02-18

## 2021-02-18 VITALS
BODY MASS INDEX: 34.5 KG/M2 | RESPIRATION RATE: 20 BRPM | TEMPERATURE: 98.3 F | OXYGEN SATURATION: 99 % | DIASTOLIC BLOOD PRESSURE: 70 MMHG | WEIGHT: 237 LBS | SYSTOLIC BLOOD PRESSURE: 127 MMHG | HEART RATE: 72 BPM

## 2021-02-18 VITALS — HEART RATE: 88 BPM

## 2021-02-18 DIAGNOSIS — Z12.11 SCREEN FOR COLON CANCER: ICD-10-CM

## 2021-02-18 PROCEDURE — 88305 TISSUE EXAM BY PATHOLOGIST: CPT | Performed by: PATHOLOGY

## 2021-02-18 PROCEDURE — 45385 COLONOSCOPY W/LESION REMOVAL: CPT | Performed by: INTERNAL MEDICINE

## 2021-02-18 PROCEDURE — 45380 COLONOSCOPY AND BIOPSY: CPT | Performed by: INTERNAL MEDICINE

## 2021-02-18 RX ORDER — LIDOCAINE HYDROCHLORIDE 20 MG/ML
INJECTION, SOLUTION EPIDURAL; INFILTRATION; INTRACAUDAL; PERINEURAL AS NEEDED
Status: DISCONTINUED | OUTPATIENT
Start: 2021-02-18 | End: 2021-02-18

## 2021-02-18 RX ORDER — PROPOFOL 10 MG/ML
INJECTION, EMULSION INTRAVENOUS AS NEEDED
Status: DISCONTINUED | OUTPATIENT
Start: 2021-02-18 | End: 2021-02-18

## 2021-02-18 RX ORDER — SODIUM CHLORIDE 9 MG/ML
125 INJECTION, SOLUTION INTRAVENOUS CONTINUOUS
Status: DISCONTINUED | OUTPATIENT
Start: 2021-02-18 | End: 2021-02-18

## 2021-02-18 RX ADMIN — SODIUM CHLORIDE 125 ML/HR: 0.9 INJECTION, SOLUTION INTRAVENOUS at 10:06

## 2021-02-18 RX ADMIN — LIDOCAINE HYDROCHLORIDE 100 MG: 20 INJECTION, SOLUTION EPIDURAL; INFILTRATION; INTRACAUDAL; PERINEURAL at 10:30

## 2021-02-18 RX ADMIN — PROPOFOL 50 MG: 10 INJECTION, EMULSION INTRAVENOUS at 10:41

## 2021-02-18 RX ADMIN — PROPOFOL 100 MG: 10 INJECTION, EMULSION INTRAVENOUS at 10:30

## 2021-02-18 RX ADMIN — PROPOFOL 50 MG: 10 INJECTION, EMULSION INTRAVENOUS at 10:38

## 2021-02-18 RX ADMIN — PROPOFOL 50 MG: 10 INJECTION, EMULSION INTRAVENOUS at 10:34

## 2021-02-18 RX ADMIN — PROPOFOL 50 MG: 10 INJECTION, EMULSION INTRAVENOUS at 10:46

## 2021-02-18 NOTE — DISCHARGE INSTRUCTIONS
Colonoscopy   WHAT YOU NEED TO KNOW:   A colonoscopy is a procedure to examine the inside of your colon (intestine) with a scope  Polyps or tissue growths may have been removed during your colonoscopy  It is normal to feel bloated and to have some abdominal discomfort  You should be passing gas  If you have hemorrhoids or you had polyps removed, you may have a small amount of bleeding  DISCHARGE INSTRUCTIONS:   Seek care immediately if:   · You have a large amount of bright red blood in your bowel movements  · Your abdomen is hard and firm and you have severe pain  · You have sudden trouble breathing  Contact your healthcare provider if:   · You develop a rash or hives  · You have a fever within 24 hours of your procedure       · You have not had a bowel movement for 3 days after your procedure  · You have questions or concerns about your condition or care  Activity:   · Do not lift, strain, or run  for 3 days after your procedure  · Rest after your procedure  You have been given medicine to relax you  Do not  drive or make important decisions until the day after your procedure  Return to your normal activity as directed  · Relieve gas and discomfort from bloating  by lying on your right side with a heating pad on your abdomen  You may need to take short walks to help the gas move out  Eat small meals until bloating is relieved  If you had polyps removed: For 7 days after your procedure:  · Do not  take aspirin  · Do not  go on long car rides  Follow up with your healthcare provider as directed:  Write down your questions so you remember to ask them during your visits  © 2017 [address] is for End User's use only and may not be sold, redistributed or otherwise used for commercial purposes  All illustrations and images included in CareNotes® are the copyrighted property of [employer]  or [employer]    The above information is an  only  It is not intended as medical advice for individual conditions or treatments  Talk to your doctor, nurse or pharmacist before following any medical regimen to see if it is safe and effective for you  Colorectal Polyps   WHAT YOU NEED TO KNOW:   Colorectal polyps are small growths of tissue in the lining of the colon and rectum  Most polyps are hyperplastic polyps and are usually benign (noncancerous)  Certain types of polyps, called adenomatous polyps, may turn into cancer  DISCHARGE INSTRUCTIONS:   Follow up with your healthcare provider or gastroenterologist as directed: You may need to return for more tests, such as another colonoscopy  Write down your questions so you remember to ask them during your visits  Reduce your risk for colorectal polyps:   · Eat a variety of healthy foods:  Healthy foods include fruit, vegetables, whole-grain breads, low-fat dairy products, beans, lean meat, and fish  Ask if you need to be on a special diet  · Maintain a healthy weight:  Ask your healthcare provider if you need to lose weight and how much you need to lose  Ask for help with a weight loss program     · Exercise:  Begin to exercise slowly and do more as you get stronger  Talk with your healthcare provider before you start an exercise program      · Limit alcohol:  Your risk for polyps increases the more you drink  · Do not smoke: If you smoke, it is never too late to quit  Ask for information about how to stop  For support and more information:   · Gabriele Gómez ([facility]) [address], [place] [address]  Phone: [phone]  Web Address: www digestive  niddk nih gov    Contact your healthcare provider or gastroenterologist if:   · You have a fever  · You have chills, a cough, or feel weak and achy  · You have abdominal pain that does not go away or gets worse after you take medicine  · Your abdomen is swollen  · You are losing weight without trying  · You have questions or concerns about your condition or care  Seek care immediately or call 911 if:   · You have sudden shortness of breath  · You have a fast heart rate, fast breathing, or are too dizzy to stand up  · You have severe abdominal pain  · You see blood in your bowel movement  © Copyright [address] Information is for End User's use only and may not be sold, redistributed or otherwise used for commercial purposes  All illustrations and images included in CareNotes® are the copyrighted property of [employer]  or [address] [place], [place] [address]saran Kramer   The above information is an  only  It is not intended as medical advice for individual conditions or treatments  Talk to your doctor, nurse or pharmacist before following any medical regimen to see if it is safe and effective for you

## 2021-02-18 NOTE — TELEPHONE ENCOUNTER
Left message for the patient sleep study resulted   Call back number provided    Left detailed message of DME set up appointment

## 2021-02-18 NOTE — H&P
History and Physical - [facility]  Elle Gibson 45 y o  male MRN: [id number]                  HPI: Elle Gibson is a 45y o  year old male who presents for colon cancer screening      REVIEW OF SYSTEMS: Per the HPI, and otherwise unremarkable      Historical Information   Past Medical History:   Diagnosis Date    Lumbosacral ligament sprain 08/24/2013    Last assessed    Obstructive sleep apnea      Past Surgical History:   Procedure Laterality Date    APPENDECTOMY  09/12/2014    Last assessed     Social History   Social History     Substance and Sexual Activity   Alcohol Use Yes    Alcohol/week: 1 0 - 2 0 standard drinks    Types: 1 - 2 Glasses of wine per week    Frequency: 2-4 times a month    Drinks per session: 1 or 2    Binge frequency: Never     Social History     Substance and Sexual Activity   Drug Use No     Social History     Tobacco Use   Smoking Status Never Smoker   Smokeless Tobacco Never Used     Family History   Problem Relation Age of Onset    Diabetes Father     Heart disease Father     Allergic rhinitis Family     Asthma Family     Eczema Family     Hypertension Family     Stroke Family        Meds/Allergies       Current Outpatient Medications:     albuterol (PROAIR HFA) 90 mcg/act inhaler    albuterol (PROVENTIL HFA,VENTOLIN HFA) 90 mcg/act inhaler    ALPRAZolam (NIRAVAM) 0 5 MG dissolvable tablet    atorvastatin (LIPITOR) 20 mg tablet    cholecalciferol (VITAMIN D3) 1,000 units tablet    EPINEPHrine (EpiPen 2-Drew) 0 3 mg/0 3 mL SOAJ    fluticasone-salmeterol (Advair Diskus) 500-50 mcg/dose inhaler    metFORMIN (GLUCOPHAGE) 500 mg tablet    montelukast (SINGULAIR) 10 mg tablet    multivitamin (THERAGRAN) TABS    Na Sulfate-K Sulfate-Mg Sulf (Suprep Bowel Prep Kit) 17 5-3 13-1 6 GM/177ML SOLN    olmesartan-hydrochlorothiazide (BENICAR HCT) 40-12 5 MG per tablet    zolpidem (AMBIEN) 5 mg tablet    Allergies   Allergen Reactions    Penicillins        Objective     There were no vitals taken for this visit  PHYSICAL EXAM    Gen: NAD  CV: RRR  CHEST: Clear  ABD: soft, NT/ND  EXT: no edema      ASSESSMENT/PLAN:  This is a 45y o  year old male here for colon cancer screening, and he is stable and optimized for his procedure

## 2021-02-23 NOTE — TELEPHONE ENCOUNTER
Recalled the patient advised sleep study results  Patient rescheduled for earlier DME appointment     Appointment information mailed to yareli

## 2021-03-09 DIAGNOSIS — J45.909 ASTHMA, UNSPECIFIED ASTHMA SEVERITY, UNSPECIFIED WHETHER COMPLICATED, UNSPECIFIED WHETHER PERSISTENT: ICD-10-CM

## 2021-03-10 DIAGNOSIS — Z23 ENCOUNTER FOR IMMUNIZATION: ICD-10-CM

## 2021-03-10 RX ORDER — MONTELUKAST SODIUM 10 MG/1
TABLET ORAL
Qty: 90 TABLET | Refills: 1 | Status: SHIPPED | OUTPATIENT
Start: 2021-03-10 | End: 2021-09-03

## 2021-03-11 DIAGNOSIS — J45.909 ASTHMA, UNSPECIFIED ASTHMA SEVERITY, UNSPECIFIED WHETHER COMPLICATED, UNSPECIFIED WHETHER PERSISTENT: ICD-10-CM

## 2021-03-11 RX ORDER — ALBUTEROL SULFATE 90 UG/1
2 AEROSOL, METERED RESPIRATORY (INHALATION) EVERY 4 HOURS PRN
Qty: 18 INHALER | Refills: 0 | Status: SHIPPED | OUTPATIENT
Start: 2021-03-11 | End: 2021-09-24

## 2021-03-12 ENCOUNTER — IMMUNIZATIONS (OUTPATIENT)
Dept: FAMILY MEDICINE CLINIC | Facility: HOSPITAL | Age: 51
End: 2021-03-12

## 2021-03-12 DIAGNOSIS — Z23 ENCOUNTER FOR IMMUNIZATION: Primary | ICD-10-CM

## 2021-03-12 PROCEDURE — 0011A SARS-COV-2 / COVID-19 MRNA VACCINE (MODERNA) 100 MCG: CPT

## 2021-03-12 PROCEDURE — 91301 SARS-COV-2 / COVID-19 MRNA VACCINE (MODERNA) 100 MCG: CPT

## 2021-04-09 ENCOUNTER — IMMUNIZATIONS (OUTPATIENT)
Dept: FAMILY MEDICINE CLINIC | Facility: HOSPITAL | Age: 51
End: 2021-04-09

## 2021-04-09 DIAGNOSIS — Z23 ENCOUNTER FOR IMMUNIZATION: Primary | ICD-10-CM

## 2021-04-09 PROCEDURE — 91301 SARS-COV-2 / COVID-19 MRNA VACCINE (MODERNA) 100 MCG: CPT

## 2021-04-09 PROCEDURE — 0012A SARS-COV-2 / COVID-19 MRNA VACCINE (MODERNA) 100 MCG: CPT

## 2021-04-23 ENCOUNTER — DOCUMENTATION (OUTPATIENT)
Dept: FAMILY MEDICINE CLINIC | Facility: CLINIC | Age: 51
End: 2021-04-23

## 2021-04-23 DIAGNOSIS — Z23 NEED FOR VACCINATION: Primary | ICD-10-CM

## 2021-04-26 ENCOUNTER — CLINICAL SUPPORT (OUTPATIENT)
Dept: FAMILY MEDICINE CLINIC | Facility: CLINIC | Age: 51
End: 2021-04-26
Payer: COMMERCIAL

## 2021-04-26 DIAGNOSIS — Z23 NEED FOR SHINGLES VACCINE: Primary | ICD-10-CM

## 2021-04-26 PROCEDURE — 90750 HZV VACC RECOMBINANT IM: CPT | Performed by: FAMILY MEDICINE

## 2021-04-26 PROCEDURE — 90471 IMMUNIZATION ADMIN: CPT | Performed by: FAMILY MEDICINE

## 2021-05-11 DIAGNOSIS — I10 ESSENTIAL HYPERTENSION: ICD-10-CM

## 2021-05-11 RX ORDER — OLMESARTAN MEDOXOMIL AND HYDROCHLOROTHIAZIDE 40/12.5 40; 12.5 MG/1; MG/1
TABLET ORAL
Qty: 90 TABLET | Refills: 1 | Status: SHIPPED | OUTPATIENT
Start: 2021-05-11 | End: 2021-11-09

## 2021-05-17 DIAGNOSIS — E78.5 HYPERLIPIDEMIA, UNSPECIFIED HYPERLIPIDEMIA TYPE: ICD-10-CM

## 2021-05-17 RX ORDER — ATORVASTATIN CALCIUM 20 MG/1
TABLET, FILM COATED ORAL
Qty: 90 TABLET | Refills: 1 | Status: SHIPPED | OUTPATIENT
Start: 2021-05-17 | End: 2021-11-15 | Stop reason: SDUPTHER

## 2021-05-25 ENCOUNTER — OFFICE VISIT (OUTPATIENT)
Dept: SLEEP CENTER | Facility: CLINIC | Age: 51
End: 2021-05-25
Payer: COMMERCIAL

## 2021-05-25 VITALS
HEIGHT: 70 IN | BODY MASS INDEX: 35.3 KG/M2 | SYSTOLIC BLOOD PRESSURE: 116 MMHG | WEIGHT: 246.6 LBS | DIASTOLIC BLOOD PRESSURE: 78 MMHG

## 2021-05-25 DIAGNOSIS — R68.2 DRY MOUTH: ICD-10-CM

## 2021-05-25 DIAGNOSIS — J45.909 ASTHMA, UNSPECIFIED ASTHMA SEVERITY, UNSPECIFIED WHETHER COMPLICATED, UNSPECIFIED WHETHER PERSISTENT: ICD-10-CM

## 2021-05-25 DIAGNOSIS — G47.33 OSA (OBSTRUCTIVE SLEEP APNEA): Primary | ICD-10-CM

## 2021-05-25 DIAGNOSIS — G47.9 SLEEP DISTURBANCE: ICD-10-CM

## 2021-05-25 DIAGNOSIS — E66.9 OBESITY (BMI 30-39.9): ICD-10-CM

## 2021-05-25 DIAGNOSIS — Z91.09 ENVIRONMENTAL ALLERGIES: ICD-10-CM

## 2021-05-25 DIAGNOSIS — E11.9 CONTROLLED TYPE 2 DIABETES MELLITUS WITHOUT COMPLICATION, WITHOUT LONG-TERM CURRENT USE OF INSULIN (HCC): ICD-10-CM

## 2021-05-25 DIAGNOSIS — I10 ESSENTIAL HYPERTENSION: ICD-10-CM

## 2021-05-25 DIAGNOSIS — F41.9 ANXIETY: ICD-10-CM

## 2021-05-25 PROCEDURE — 3074F SYST BP LT 130 MM HG: CPT | Performed by: INTERNAL MEDICINE

## 2021-05-25 PROCEDURE — 3078F DIAST BP <80 MM HG: CPT | Performed by: INTERNAL MEDICINE

## 2021-05-25 PROCEDURE — 1036F TOBACCO NON-USER: CPT | Performed by: INTERNAL MEDICINE

## 2021-05-25 PROCEDURE — 3008F BODY MASS INDEX DOCD: CPT | Performed by: INTERNAL MEDICINE

## 2021-05-25 PROCEDURE — 99214 OFFICE O/P EST MOD 30 MIN: CPT | Performed by: INTERNAL MEDICINE

## 2021-05-25 NOTE — PROGRESS NOTES
Follow-Up Note - Sleep Center   Lino Donovan  45 y o  male  :1970  VLU:[id number]    CC: I saw this patient for follow-up in clinic today for Sleep disordered breathing, Coexisting Sleep and Medical Problems  A sleep study to titrate Positive airway pressure (PAP) therapy was undertaken  Patient is here to review results and to adjust therapy  The study demonstrated sleep disordered breathing was adequately remediated with PAP at 9-10 cm H2O  Home sleep testing in 2020 demonstrated GREYSON (respiratory event index of) 26 8 /hour  Minimum oxygen saturation 81%  and 6 1% of total sleep time was spent with saturations less than 90%  The snore index was 9 1%  PFSH, Problem List, Medications & Allergies were reviewed in EMR  Interval changes: none reported  He  has a past medical history of Asthma, Diabetes mellitus ([facility] [address] ), Hyperlipidemia, Hypertension, Lumbosacral ligament sprain (2013), and Obstructive sleep apnea  He has a current medication list which includes the following prescription(s): albuterol, albuterol, alprazolam, atorvastatin, cholecalciferol, epinephrine, fluticasone-salmeterol, metformin, montelukast, multivitamin, olmesartan-hydrochlorothiazide, and zolpidem  PHYSIOLOGICAL DATA REVIEW AND INTERPRETATION:   using PAP > 4 hours/night 93%  Estimated GREYSON 3 8/hour with pressure of 11 6 cm H2O @90th percentile  Compliance: excellent; Sleep disordered breathing:stable & within target range    SUBJECTIVE: Regarding use of PAP, Xochitl Salazar reports:   · He is experiencing some adverse effects: dry mouth/throat and mask leaks   · He is benefiting from use: no longer snoring / having breathing difficulties   Sleep Routine: He reports getting >6 hrs sleep  ; he no difficulty initiating, but reports diffculty maintaining sleep  because of mask leaks and is using Advil p m  As a sleep aid   He awakens before alarm most days and is not always refreshed  Belinda Farris denies Excessive Daytime Sleepiness or nap and rated himself at Total score: 7 /24 on the [place] Sleepiness Scale  Habits: reports that he has never smoked  He has never used smokeless tobacco ,  reports current alcohol use of about 1 0 - 2 0 standard drinks of alcohol per week  ,  reports no history of drug use , Caffeine use: limited , Exercise routine: none   ROS: as attached  Significant for weight has been stable  Allergies have escalated recently but asthma is controlled  He has not been needing alprazolam except when he travels by air  EXAM: /78   Ht 5' 9 5" (1 765 m)   Wt 112 kg (246 lb 9 6 oz)   BMI 35 89 kg/m²     Patient is well groomed; well appearing  H&N: EOMI; NC/AT:no facial pressure marks, no rashes  Skin/Extrem: col & hydration normal; no edema  Psych: cooperativeand in no distress  Mental state:appears normal   Resp: Respiratory effort is normal  CNS: Alert, orientated, clear & coherent speech  Physical findings otherwise essentially unchanged from previous  IMPRESSION: Problem List Items & Comorbidities Addressed this Visit    1  ROSA MARIA (obstructive sleep apnea)  PAP DME Resupply/Reorder   2  Sleep disturbance     3  Dry mouth     4  Asthma, unspecified asthma severity, unspecified whether complicated, unspecified whether persistent     5  Environmental allergies     6  Anxiety     7  Essential hypertension     8  Obesity (BMI 30-39 9)     9  Controlled type 2 diabetes mellitus without complication, without long-term current use of insulin ([facility] )         PLAN:  1  I reviewed results of prior studies and physiologic data with the patient  I discussed treatment options with risks and benefits  2  Treatment with  PAP is medically necessary and Belinda Farris is agreable to continue use  3  Care of equipment, methods to improve comfort using PAP and importance of compliance with therapy were discussed  4  Pressure setting: continue 9-12 cmH2O  5  Rx provided to replace  supplies and Care coordinated with DME provider  6  He is on treatment for his nasal symptoms  7  Strategies to improve dry mouth were discussed  Specifically, adequate treatment of nasal allergies, adjusting heat and humidity settings on PAP machine, using Biotene mouthwash &/or Xylimelt  8  Discussed strategies for weight reduction  9  Follow-up is advised in 1 year or sooner if needed to monitor progress, compliance and to adjust therapy  Thank you for allowing me to participate in the care of this patient      Sincerely,    Authenticated electronically by Allegra Piña MD on 56/54/89   Board Certified Specialist

## 2021-05-25 NOTE — PROGRESS NOTES
Review of Systems      Genitourinary none   Cardiology none   Gastrointestinal none   Neurology none   Constitutional weight change   Integumentary none   Psychiatry anxiety   Musculoskeletal none   Pulmonary none   ENT none   Endocrine none   Hematological none

## 2021-05-25 NOTE — PATIENT INSTRUCTIONS
Strategies to improve dry mouth were discussed  Specifically, adequate treatment of nasal allergies, adjusting heat and humidity settings on PAP machine, using Biotene mouthwash &/or Xylimelt  Nursing Support:  When: Monday through Friday 7A-5PM except holidays  Where: Our direct line is [phone]  If you are having a true emergency please call 911  In the event that the line is busy or it is after hours please leave a voice message and we will return your call  Please speak clearly, leaving your full name, birth date, best number to reach you and the reason for your call  Medication refills: We will need the name of the medication, the dosage, the ordering provider, whether you get a 30 or 90 day refill, and the pharmacy name and address  Medications will be ordered by the provider only  Nurses cannot call in prescriptions  Please allow 7 days for medication refills  Physician requested updates: If your provider requested that you call with an update after starting medication, please be ready to provide us the medication and dosage, what time you take your medication, the time you attempt to fall asleep, time you fall asleep, when you wake up, and what time you get out of bed  Sleep Study Results: We will contact you with sleep study results and/or next steps after the physician has reviewed your testing

## 2021-05-26 ENCOUNTER — TELEPHONE (OUTPATIENT)
Dept: SLEEP CENTER | Facility: CLINIC | Age: 51
End: 2021-05-26

## 2021-06-03 DIAGNOSIS — E13.9 DIABETES 1.5, MANAGED AS TYPE 2 (HCC): ICD-10-CM

## 2021-09-03 DIAGNOSIS — J45.909 ASTHMA, UNSPECIFIED ASTHMA SEVERITY, UNSPECIFIED WHETHER COMPLICATED, UNSPECIFIED WHETHER PERSISTENT: ICD-10-CM

## 2021-09-03 RX ORDER — MONTELUKAST SODIUM 10 MG/1
TABLET ORAL
Qty: 90 TABLET | Refills: 1 | Status: SHIPPED | OUTPATIENT
Start: 2021-09-03 | End: 2022-03-03 | Stop reason: SDUPTHER

## 2021-09-10 ENCOUNTER — CLINICAL SUPPORT (OUTPATIENT)
Dept: FAMILY MEDICINE CLINIC | Facility: CLINIC | Age: 51
End: 2021-09-10
Payer: COMMERCIAL

## 2021-09-10 DIAGNOSIS — Z23 NEED FOR SHINGLES VACCINE: Primary | ICD-10-CM

## 2021-09-10 PROCEDURE — 90750 HZV VACC RECOMBINANT IM: CPT | Performed by: FAMILY MEDICINE

## 2021-09-10 PROCEDURE — 90471 IMMUNIZATION ADMIN: CPT | Performed by: FAMILY MEDICINE

## 2021-09-24 DIAGNOSIS — J45.909 ASTHMA, UNSPECIFIED ASTHMA SEVERITY, UNSPECIFIED WHETHER COMPLICATED, UNSPECIFIED WHETHER PERSISTENT: ICD-10-CM

## 2021-09-24 RX ORDER — ALBUTEROL SULFATE 90 UG/1
2 AEROSOL, METERED RESPIRATORY (INHALATION) EVERY 4 HOURS PRN
Qty: 18 G | Refills: 3 | Status: SHIPPED | OUTPATIENT
Start: 2021-09-24

## 2021-10-07 DIAGNOSIS — F41.9 ANXIETY: ICD-10-CM

## 2021-10-07 DIAGNOSIS — T78.2XXD ANAPHYLAXIS, SUBSEQUENT ENCOUNTER: ICD-10-CM

## 2021-10-08 RX ORDER — EPINEPHRINE 0.3 MG/.3ML
0.3 INJECTION SUBCUTANEOUS ONCE
Qty: 2 EACH | Refills: 0 | Status: SHIPPED | OUTPATIENT
Start: 2021-10-08 | End: 2021-10-08

## 2021-10-08 RX ORDER — ALPRAZOLAM 0.5 MG/1
0.5 TABLET, ORALLY DISINTEGRATING ORAL AS NEEDED
Qty: 15 TABLET | Refills: 0 | Status: SHIPPED | OUTPATIENT
Start: 2021-10-08

## 2021-10-26 ENCOUNTER — IMMUNIZATIONS (OUTPATIENT)
Dept: FAMILY MEDICINE CLINIC | Facility: HOSPITAL | Age: 51
End: 2021-10-26

## 2021-10-26 DIAGNOSIS — Z23 ENCOUNTER FOR IMMUNIZATION: Primary | ICD-10-CM

## 2021-11-09 DIAGNOSIS — I10 ESSENTIAL HYPERTENSION: ICD-10-CM

## 2021-11-09 RX ORDER — OLMESARTAN MEDOXOMIL AND HYDROCHLOROTHIAZIDE 40/12.5 40; 12.5 MG/1; MG/1
TABLET ORAL
Qty: 90 TABLET | Refills: 1 | Status: SHIPPED | OUTPATIENT
Start: 2021-11-09 | End: 2022-05-02

## 2021-11-15 ENCOUNTER — OFFICE VISIT (OUTPATIENT)
Dept: FAMILY MEDICINE CLINIC | Facility: CLINIC | Age: 51
End: 2021-11-15
Payer: COMMERCIAL

## 2021-11-15 VITALS
OXYGEN SATURATION: 99 % | SYSTOLIC BLOOD PRESSURE: 128 MMHG | HEIGHT: 70 IN | DIASTOLIC BLOOD PRESSURE: 88 MMHG | BODY MASS INDEX: 35.39 KG/M2 | WEIGHT: 247.2 LBS | TEMPERATURE: 98.4 F | HEART RATE: 63 BPM

## 2021-11-15 DIAGNOSIS — G47.33 OBSTRUCTIVE SLEEP APNEA SYNDROME: ICD-10-CM

## 2021-11-15 DIAGNOSIS — Z23 NEED FOR INFLUENZA VACCINATION: Primary | ICD-10-CM

## 2021-11-15 DIAGNOSIS — I10 ESSENTIAL HYPERTENSION: ICD-10-CM

## 2021-11-15 DIAGNOSIS — E13.9 DIABETES 1.5, MANAGED AS TYPE 2 (HCC): ICD-10-CM

## 2021-11-15 DIAGNOSIS — E11.9 CONTROLLED TYPE 2 DIABETES MELLITUS WITHOUT COMPLICATION, WITHOUT LONG-TERM CURRENT USE OF INSULIN (HCC): ICD-10-CM

## 2021-11-15 DIAGNOSIS — E78.5 HYPERLIPIDEMIA, UNSPECIFIED HYPERLIPIDEMIA TYPE: ICD-10-CM

## 2021-11-15 DIAGNOSIS — J45.909 ASTHMA, UNSPECIFIED ASTHMA SEVERITY, UNSPECIFIED WHETHER COMPLICATED, UNSPECIFIED WHETHER PERSISTENT: ICD-10-CM

## 2021-11-15 DIAGNOSIS — Z13.29 SCREENING FOR THYROID DISORDER: ICD-10-CM

## 2021-11-15 DIAGNOSIS — Z13.0 SCREENING FOR IRON DEFICIENCY ANEMIA: ICD-10-CM

## 2021-11-15 PROCEDURE — 99215 OFFICE O/P EST HI 40 MIN: CPT | Performed by: FAMILY MEDICINE

## 2021-11-15 PROCEDURE — 3725F SCREEN DEPRESSION PERFORMED: CPT | Performed by: FAMILY MEDICINE

## 2021-11-15 PROCEDURE — 90471 IMMUNIZATION ADMIN: CPT | Performed by: FAMILY MEDICINE

## 2021-11-15 PROCEDURE — 3074F SYST BP LT 130 MM HG: CPT | Performed by: FAMILY MEDICINE

## 2021-11-15 PROCEDURE — 3008F BODY MASS INDEX DOCD: CPT | Performed by: FAMILY MEDICINE

## 2021-11-15 PROCEDURE — 90682 RIV4 VACC RECOMBINANT DNA IM: CPT | Performed by: FAMILY MEDICINE

## 2021-11-15 PROCEDURE — 1036F TOBACCO NON-USER: CPT | Performed by: FAMILY MEDICINE

## 2021-11-15 PROCEDURE — 3079F DIAST BP 80-89 MM HG: CPT | Performed by: FAMILY MEDICINE

## 2021-11-15 RX ORDER — ATORVASTATIN CALCIUM 20 MG/1
20 TABLET, FILM COATED ORAL DAILY
Qty: 90 TABLET | Refills: 1 | Status: SHIPPED | OUTPATIENT
Start: 2021-11-15 | End: 2022-05-12 | Stop reason: SDUPTHER

## 2021-11-20 DIAGNOSIS — J45.909 ASTHMA, UNSPECIFIED ASTHMA SEVERITY, UNSPECIFIED WHETHER COMPLICATED, UNSPECIFIED WHETHER PERSISTENT: ICD-10-CM

## 2021-11-22 LAB
1,5-ANHYDROGLUCITOL SERPL-MCNC: 6.7 MCG/ML (ref 7.3–36.6)
ALBUMIN SERPL-MCNC: 4.5 G/DL (ref 3.6–5.1)
ALBUMIN/CREAT UR: 9 MCG/MG CREAT
ALBUMIN/GLOB SERPL: 1.5 (CALC) (ref 1–2.5)
ALP SERPL-CCNC: 55 U/L (ref 35–144)
ALT SERPL-CCNC: 76 U/L (ref 9–46)
AST SERPL-CCNC: 34 U/L (ref 10–35)
BILIRUB SERPL-MCNC: 0.7 MG/DL (ref 0.2–1.2)
BUN SERPL-MCNC: 9 MG/DL (ref 7–25)
BUN/CREAT SERPL: ABNORMAL (CALC) (ref 6–22)
CALCIUM SERPL-MCNC: 9.5 MG/DL (ref 8.6–10.3)
CHLORIDE SERPL-SCNC: 101 MMOL/L (ref 98–110)
CHOLEST SERPL-MCNC: 161 MG/DL
CHOLEST/HDLC SERPL: 4.2 (CALC)
CO2 SERPL-SCNC: 28 MMOL/L (ref 20–32)
CREAT SERPL-MCNC: 0.89 MG/DL (ref 0.7–1.33)
CREAT UR-MCNC: 43 MG/DL (ref 20–320)
ERYTHROCYTE [DISTWIDTH] IN BLOOD BY AUTOMATED COUNT: 12.9 % (ref 11–15)
GLOBULIN SER CALC-MCNC: 3.1 G/DL (CALC) (ref 1.9–3.7)
GLUCOSE SERPL-MCNC: 150 MG/DL (ref 65–99)
HBA1C MFR BLD: 7.5 % OF TOTAL HGB
HCT VFR BLD AUTO: 43.4 % (ref 38.5–50)
HDLC SERPL-MCNC: 38 MG/DL
HGB BLD-MCNC: 14.6 G/DL (ref 13.2–17.1)
LDLC SERPL CALC-MCNC: 93 MG/DL (CALC)
MCH RBC QN AUTO: 28.6 PG (ref 27–33)
MCHC RBC AUTO-ENTMCNC: 33.6 G/DL (ref 32–36)
MCV RBC AUTO: 84.9 FL (ref 80–100)
MICROALBUMIN UR-MCNC: 0.4 MG/DL
NONHDLC SERPL-MCNC: 123 MG/DL (CALC)
PLATELET # BLD AUTO: 176 THOUSAND/UL (ref 140–400)
PMV BLD REES-ECKER: 11.2 FL (ref 7.5–12.5)
POTASSIUM SERPL-SCNC: 3.9 MMOL/L (ref 3.5–5.3)
PROT SERPL-MCNC: 7.6 G/DL (ref 6.1–8.1)
RBC # BLD AUTO: 5.11 MILLION/UL (ref 4.2–5.8)
SL AMB EGFR AFRICAN AMERICAN: 115 ML/MIN/1.73M2
SL AMB EGFR NON AFRICAN AMERICAN: 99 ML/MIN/1.73M2
SODIUM SERPL-SCNC: 138 MMOL/L (ref 135–146)
TRIGL SERPL-MCNC: 207 MG/DL
TSH SERPL-ACNC: 1.82 MIU/L (ref 0.4–4.5)
WBC # BLD AUTO: 8.3 THOUSAND/UL (ref 3.8–10.8)

## 2021-11-22 PROCEDURE — 3061F NEG MICROALBUMINURIA REV: CPT | Performed by: FAMILY MEDICINE

## 2021-11-22 PROCEDURE — 3051F HG A1C>EQUAL 7.0%<8.0%: CPT | Performed by: FAMILY MEDICINE

## 2021-12-14 ENCOUNTER — TELEPHONE (OUTPATIENT)
Dept: DIABETES SERVICES | Facility: CLINIC | Age: 51
End: 2021-12-14

## 2021-12-15 ENCOUNTER — TELEPHONE (OUTPATIENT)
Dept: DIABETES SERVICES | Facility: CLINIC | Age: 51
End: 2021-12-15

## 2021-12-16 ENCOUNTER — TELEPHONE (OUTPATIENT)
Dept: DIABETES SERVICES | Facility: CLINIC | Age: 51
End: 2021-12-16

## 2022-01-19 ENCOUNTER — OFFICE VISIT (OUTPATIENT)
Dept: DIABETES SERVICES | Facility: CLINIC | Age: 52
End: 2022-01-19
Payer: COMMERCIAL

## 2022-01-19 DIAGNOSIS — E11.9 TYPE 2 DIABETES MELLITUS WITHOUT COMPLICATION, WITHOUT LONG-TERM CURRENT USE OF INSULIN (HCC): Primary | ICD-10-CM

## 2022-01-19 PROCEDURE — 98960 EDU&TRN PT SELF-MGMT NQHP 1: CPT | Performed by: DIETITIAN, REGISTERED

## 2022-01-19 NOTE — PROGRESS NOTES
Type 2 Diabetes Class Assessment    HPI: Met with Ramakrishna Jama for DSME Initial visit  Erin Lerma has Type 2 Diabetes  Erin Lerma will call to schedule his Living Well with Diabetes classes after he checks his work calendar  Diabetes Assessment  Visit Type: Initial visit  Present at Session: patient   Medical Diagnosis/ICD 10: E11 9  Special Learning Needs: No  Barriers to Learning: no barriers    How do you learn best? Reading and researching data  What are you most interested in learning about regards to your diabetes? Diet and understanding how it works with the insulin in the body   How do you feel about making lifestyle changes at this time? Absolutely   How would you rate your current knowledge of diabetes? fair  How confident are you that will be able to take better control of your diabetes?: excellent    How long have you had diabetes? 4 years ago  Have you had diabetes education in the past?: No  Do you have any family members with diabetes?: Yes - father  Do you monitor your blood sugar? yes  Type of blood sugar monitor: Faina White  How old is your meter?: 2 years ago  How often do you test your blood sugars?:twice a day FBS and before or after dinner  Do you keep a written record of your blood sugars? Only in the [facility]  Blood sugar log with patient today and reviewed by educator?: Yes   Blood Sugar ranges:    Fasting: n/a only eats once a day --dinner   Before meals: 110-170   2 hours after meals: 115-120  Any financial concerns pertaining to your diabetes supplies, medication or care?: No  Have you ever experienced hypoglycemia?:  No  Have you ever been hospitalized or gone to the ER due to your blood sugars?: No  How do you treat low blood sugars?: OJ or soda  How do you treat high blood sugars?  Exercise/move around more  Do you wear a Diabetes I D ?: no  Where do you dispose of your sharps (needles,lancetes)?: red plastic container     Lab Results   Component Value Date    HGBA1C 7 5 (H) 11/17/2021    HGBA1C 7 4 (H) 11/18/2020    HGBA1C 5 9 (H) 10/16/2019       Lab Results   Component Value Date    CHOL 170 10/06/2017    CHOL 245 (H) 09/07/2016     Lab Results   Component Value Date    HDL 38 (L) 11/17/2021    HDL 34 (L) 11/18/2020    HDL 49 10/16/2019     Lab Results   Component Value Date    LDLCALC 93 11/17/2021    LDLCALC 78 11/18/2020    LDLCALC 64 10/16/2019     Lab Results   Component Value Date    TRIG 207 (H) 11/17/2021    TRIG 177 (H) 11/18/2020    TRIG 83 10/16/2019     No results found for: CHOLHDL  No results found for: Beto Row    There is no height or weight on file to calculate BMI  Ht Readings from Last 1 Encounters:   11/15/21 5' 9 69" (1 77 m)     Wt Readings from Last 1 Encounters:   11/15/21 112 kg (247 lb 3 2 oz)     Weight Change: Yes March 2020 weighed 213 pounds; gained 30-35 pounds in the past 2 years      Diet Assessment    Do you follow any special diet presently?: Yes - trying to reduce carbohydrate intake  Who cooks at home?:  patient  Who does the grocery shopping?: patient   How frequently do you eat out?: 2-3 days a week    Activity Assessment    Exercise: nothing beyond daily activities     Lifestyle/Social Assessment    Racial/ethnic group:  and                                       Primary Language: English  Marital Status: Single  Education Level: Doctorate Degree   Work status: Full Time  Type of job and hours: day hours, 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; R&D and marketing and sales  Who lives in your household?: none  Who is you primary support person(s): parent(s) sibling(s)   Describe your quality of life currently?: good  Any concerns for your safety?: No  Any Muslim or cultural practices that may affect your diabetes care: No  Do you have a decrease or loss of hearing?: No  Do you have a decrease or loss of vision?: No  When was the last time you had an ophthalmology exam?: 12/12/2020  When was the last time you had dental exam?: over two years ago  Do you check your feet for cracks, sores, debris?: Yes  When was the last time you had podiatry or foot exam?: 11/15/2021  Last flu shot?: fall 2021  Pneumonia shot?: No    The patient's history was reviewed and updated as appropriate: allergies, current medications  Intervention    Diabetes Overview :   Robin Menendez was instructed on basic concepts of diabetes, including identifying role of diabetes self management, basic pathophysiology and types of diabetes, A1c and blood sugar targets  Robin Menendez has good understanding of material covered  Taking Medications: Instructed patient on action, side effects, efficacy, prescribed dosage and appropriate timing and frequency of administration of his diabetes medication  Robin Menendez has good understanding of material covered  Monitoring Blood Sugars  Instructions for Meter Teaching- Patient verbally instructed in the following:  Site selection and skin preparation, Loading strips and lancet device, meter activation, obtaining blood sample, test strip and lancet disposal  Patient has good understanding of material covered  Testing frequency: Patient testing twice a day FBS and before or after dinner  Goal Blood Sugars:   Premeal , even better <110  2hr after a meal <180, even better <140  A1C <7%, even better <6 5%  Hypoglycemia: Instructed patient on definition/risk of hypoglycemia, treatment, causes/symptoms, when to notify provider of lows, prevention of hypoglycemia and exercise precautions  Comments: Robin Menendez verbalizes understanding of hypoglycemia concepts      Physical Activity: Discussed benefits of physical activity to optimize blood glucose control, encouraged activity at patient is physically able   Always consult a physician prior to starting an exercise program   Comments: Deb Anderson understanding of hypoglycemia concepts        Diabetes Education Record  Robin Menendez received the following handouts: class schedule and class assessment packet      Patient response to instruction    Comprehensiongood  Motivationgood  Expected Compliancegood  Response to Teachback: 100%, demonstrated understanding    Start- Stop: 8:00-9:00  Total Minutes: 60 Minutes  Group or Individual Instruction: DSMT-I  Other: Elva Vuong, DO    Thank you for referring your patient to [facility], it was a pleasure working with them today  Please feel free to call with any questions or concerns      Abhijit Daniel  [address] [facility] [id number]

## 2022-02-02 ENCOUNTER — TELEMEDICINE (OUTPATIENT)
Dept: DIABETES SERVICES | Facility: HOSPITAL | Age: 52
End: 2022-02-02
Payer: COMMERCIAL

## 2022-02-02 DIAGNOSIS — E11.9 CONTROLLED TYPE 2 DIABETES MELLITUS WITHOUT COMPLICATION, WITHOUT LONG-TERM CURRENT USE OF INSULIN (HCC): Primary | ICD-10-CM

## 2022-02-02 PROCEDURE — 98962 EDU&TRN PT SLF-MGMT NQHP 5-8: CPT | Performed by: DIETITIAN, REGISTERED

## 2022-02-03 NOTE — PROGRESS NOTES
Virtual Regular Visit    Verification of patient location:    Patient is located in the following state in which I hold an active license [place]      Assessment/Plan:    Problem List Items Addressed This Visit     None               Reason for visit is   Chief Complaint   Patient presents with    Virtual Regular Visit        Encounter provider Yuliya Dunlap RD    Provider located at [place] [address] [address] [place] [address]      Recent Visits  No visits were found meeting these conditions  Showing recent visits within past 7 days and meeting all other requirements  Future Appointments  No visits were found meeting these conditions  Showing future appointments within next 150 days and meeting all other requirements       The patient was identified by name and date of birth  Vaishali Doss was informed that this is a telemedicine visit and that the visit is being conducted through [employer] and patient was informed that this is a secure, HIPAA-compliant platform  He agrees to proceed     My office door was closed  No one else was in the room  He acknowledged consent and understanding of privacy and security of the video platform  The patient has agreed to participate and understands they can discontinue the visit at any time  Patient is aware this is a billable service  Living Well with Diabetes Group Class #1    Vaishali Doss attended the Living Well with Diabetes Group Class #1  Topics Covered in class today include: What is diabetes; Types of Diabetes; How Diabetes is diagnosed; Management skills; the role of exercise in blood sugar managements, Home glucose monitoring, and target ranges  Dionne Quinonez participated in group activities    The patient's history was reviewed and updated as appropriate: allergies, current medications      Lab Results   Component Value Date    HGBA1C 7 5 (H) 11/17/2021 Diabetes Education Record  Xochitl Salazar was provided Living Well with Diabetes Class #1 book      Patient response to instruction    Comprehensiongood  Motivationgood  Expected Compliancegood    Begin Time: 6pm  End Time: 8pm  Referring Provider: Deniz Rois    Thank you for referring your patient to [facility], it was a pleasure working with them today  Please feel free to call with any questions or concerns  Radhaitz Curling, RD  [address] [facility] [address] [phone]      Inna Donovan is a 45 y o  male see notes above         HPI     Past Medical History:   Diagnosis Date    Allergic     Anxiety     Asthma     Diabetes mellitus ([address] )     type 2, blood sugar 170 this am    Hyperlipidemia     Hypertension     Lumbosacral ligament sprain 08/24/2013    Last assessed    Obstructive sleep apnea     being fitted for CPap       Past Surgical History:   Procedure Laterality Date    APPENDECTOMY  09/12/2014    Last assessed       Current Outpatient Medications   Medication Sig Dispense Refill    Advair Diskus 500-50 MCG/DOSE inhaler TAKE 1 PUFF BY MOUTH TWICE A DAY 60 blister 3    albuterol (PROVENTIL HFA,VENTOLIN HFA) 90 mcg/act inhaler INHALE 2 PUFFS EVERY 4 (FOUR) HOURS AS NEEDED FOR WHEEZING OR SHORTNESS OF BREATH 18 g 3    ALPRAZolam (NIRAVAM) 0 5 MG dissolvable tablet Take 1 tablet (0 5 mg total) by mouth as needed (for flying) 15 tablet 0    atorvastatin (LIPITOR) 20 mg tablet Take 1 tablet (20 mg total) by mouth daily 90 tablet 1    cholecalciferol (VITAMIN D3) 1,000 units tablet Take 1,000 Units by mouth daily      EPINEPHrine (EpiPen 2-Drew) 0 3 mg/0 3 mL SOAJ Inject 0 3 mL (0 3 mg total) into a muscle once for 1 dose 2 each 0    metFORMIN (GLUCOPHAGE) 500 mg tablet Take 1 tablet (500 mg total) by mouth 2 (two) times a day with meals 180 tablet 1    montelukast (SINGULAIR) 10 mg tablet TAKE 1 TABLET BY MOUTH EVERY DAY 90 tablet 1    multivitamin (THERAGRAN) TABS Take 1 tablet by mouth daily      olmesartan-hydrochlorothiazide (BENICAR HCT) 40-12 5 MG per tablet TAKE 1 TABLET BY MOUTH EVERY DAY 90 tablet 1    Semaglutide,0 25 or 0 5MG/DOS, 2 MG/1 5ML SOPN Inject 0 25 mg under the skin once a week 3 mL 0     No current facility-administered medications for this visit  Allergies   Allergen Reactions    Penicillins        Review of Systems    Video Exam    There were no vitals filed for this visit  Physical Exam     I spent 120 minutes with patient today in which greater than 50% of the time was spent in counseling/coordination of care regarding diabetes    VIRTUAL VISIT [address] [facility] verbally agrees to participate in [employer]  Pt is aware that [employer] could be limited without vital signs or the ability to perform a full hands-on physical exam  Saman Aguilar understands he or the provider may request at any time to terminate the video visit and request the patient to seek care or treatment in person

## 2022-02-09 ENCOUNTER — TELEMEDICINE (OUTPATIENT)
Dept: DIABETES SERVICES | Facility: HOSPITAL | Age: 52
End: 2022-02-09
Payer: COMMERCIAL

## 2022-02-09 DIAGNOSIS — E11.9 CONTROLLED TYPE 2 DIABETES MELLITUS WITHOUT COMPLICATION, WITHOUT LONG-TERM CURRENT USE OF INSULIN (HCC): Primary | ICD-10-CM

## 2022-02-09 PROCEDURE — 98962 EDU&TRN PT SLF-MGMT NQHP 5-8: CPT | Performed by: DIETITIAN, REGISTERED

## 2022-02-10 NOTE — PROGRESS NOTES
Virtual Regular Visit    Verification of patient location:    Patient is located in the following state in which I hold an active license [place]      Assessment/Plan:    Problem List Items Addressed This Visit        Endocrine    Controlled type 2 diabetes mellitus without complication, without long-term current use of insulin ([facility] [address] ) - Primary               Reason for visit is   Chief Complaint   Patient presents with    Virtual Regular Visit        Encounter provider Rhonda Enrique RD    Provider located at [place] [address] [address] [place] [address]      Recent Visits  No visits were found meeting these conditions  Showing recent visits within past 7 days and meeting all other requirements  Future Appointments  No visits were found meeting these conditions  Showing future appointments within next 150 days and meeting all other requirements       The patient was identified by name and date of birth  Hosea Mcpherson was informed that this is a telemedicine visit and that the visit is being conducted through [employer] and patient was informed that this is a secure, HIPAA-compliant platform  He agrees to proceed     My office door was closed  No one else was in the room  He acknowledged consent and understanding of privacy and security of the video platform  The patient has agreed to participate and understands they can discontinue the visit at any time  Patient is aware this is a billable service  Living Well with Diabetes Group Class #2    Hosea Mcpherson attended the Living Well with Diabetes Group Class #2      During class, Allyson Henry was instructed on the following topics: Macronutrients, Carbohydrate sources, What one serving of carbohydrate equals, effects of diet on blood glucose levels, effect of carbohydrates on blood glucose levels, basics of meal planning: balance, portions, meal times, measurements, reading food labels to determine carbohydrates  [place] participated in group activities of reading labels together and completing the meal planning activity  [place] will follow up with class #3  Will call with any questions or concerns prior to next session  The patient's history was reviewed and updated as appropriate: allergies, current medications  Lab Results   Component Value Date    HGBA1C 7 5 (H) 11/17/2021       Diabetes Education Record  [place] was provided Living Well with Diabetes Class #2 book- sent via email      Patient response to instruction    Comprehensiongood  Motivationgood  Expected Compliancegood    Begin Time: 6pm  End Time: 8pm  Referring Provider: Willian Singh and Ingrid    Thank you for referring your patient to [facility], it was a pleasure working with them today  Please feel free to call with any questions or concerns  Minnie Fishman, RD  [address] [phone]    Subjective  Munira Keene is a 45 y o  male see notes above         HPI     Past Medical History:   Diagnosis Date    Allergic     Anxiety     Asthma     Diabetes mellitus (HCC)     type 2, blood sugar 170 this am    Hyperlipidemia     Hypertension     Lumbosacral ligament sprain 08/24/2013    Last assessed    Obstructive sleep apnea     being fitted for CPap       Past Surgical History:   Procedure Laterality Date    APPENDECTOMY  09/12/2014    Last assessed       Current Outpatient Medications   Medication Sig Dispense Refill    Advair Diskus 500-50 MCG/DOSE inhaler TAKE 1 PUFF BY MOUTH TWICE A DAY 60 blister 3    albuterol (PROVENTIL HFA,VENTOLIN HFA) 90 mcg/act inhaler INHALE 2 PUFFS EVERY 4 (FOUR) HOURS AS NEEDED FOR WHEEZING OR SHORTNESS OF BREATH 18 g 3    ALPRAZolam (NIRAVAM) 0 5 MG dissolvable tablet Take 1 tablet (0 5 mg total) by mouth as needed (for flying) 15 tablet 0    atorvastatin (LIPITOR) 20 mg tablet Take 1 tablet (20 mg total) by mouth daily 90 tablet 1    cholecalciferol (VITAMIN D3) 1,000 units tablet Take 1,000 Units by mouth daily      EPINEPHrine (EpiPen 2-Drew) 0 3 mg/0 3 mL SOAJ Inject 0 3 mL (0 3 mg total) into a muscle once for 1 dose 2 each 0    metFORMIN (GLUCOPHAGE) 500 mg tablet Take 1 tablet (500 mg total) by mouth 2 (two) times a day with meals 180 tablet 1    montelukast (SINGULAIR) 10 mg tablet TAKE 1 TABLET BY MOUTH EVERY DAY 90 tablet 1    multivitamin (THERAGRAN) TABS Take 1 tablet by mouth daily      olmesartan-hydrochlorothiazide (BENICAR HCT) 40-12 5 MG per tablet TAKE 1 TABLET BY MOUTH EVERY DAY 90 tablet 1    Semaglutide,0 25 or 0 5MG/DOS, 2 MG/1 5ML SOPN Inject 0 25 mg under the skin once a week 3 mL 0     No current facility-administered medications for this visit  Allergies   Allergen Reactions    Penicillins        Review of Systems    Video Exam    There were no vitals filed for this visit  Physical Exam     I spent 120 minutes with patient today in which greater than 50% of the time was spent in counseling/coordination of care regarding diabetes    VIRTUAL VISIT [address] [facility],[address] verbally agrees to participate in [employer]  Pt is aware that [employer] could be limited without vital signs or the ability to perform a full hands-on physical exam  Saman Alexis understands he or the provider may request at any time to terminate the video visit and request the patient to seek care or treatment in person

## 2022-02-16 ENCOUNTER — TELEMEDICINE (OUTPATIENT)
Dept: DIABETES SERVICES | Facility: HOSPITAL | Age: 52
End: 2022-02-16
Payer: COMMERCIAL

## 2022-02-16 DIAGNOSIS — E11.9 CONTROLLED TYPE 2 DIABETES MELLITUS WITHOUT COMPLICATION, WITHOUT LONG-TERM CURRENT USE OF INSULIN (HCC): Primary | ICD-10-CM

## 2022-02-16 PROCEDURE — 98962 EDU&TRN PT SLF-MGMT NQHP 5-8: CPT | Performed by: DIETITIAN, REGISTERED

## 2022-02-17 PROBLEM — E11.9 CONTROLLED TYPE 2 DIABETES MELLITUS WITHOUT COMPLICATION, WITHOUT LONG-TERM CURRENT USE OF INSULIN (HCC): Chronic | Status: ACTIVE | Noted: 2018-08-30

## 2022-02-17 NOTE — PROGRESS NOTES
Virtual Regular Visit    Verification of patient location:    Patient is located in the following state in which I hold an active license [place]      Assessment/Plan:    Problem List Items Addressed This Visit        Endocrine    Controlled type 2 diabetes mellitus without complication, without long-term current use of insulin ([facility] [address] ) - Primary               Reason for visit is   Chief Complaint   Patient presents with    Virtual Regular Visit        Encounter provider Duane Kamara RD    Provider located at [place] [address] [address] [place] [address]      Recent Visits  No visits were found meeting these conditions  Showing recent visits within past 7 days and meeting all other requirements  Future Appointments  No visits were found meeting these conditions  Showing future appointments within next 150 days and meeting all other requirements       The patient was identified by name and date of birth  Suzanne Starks was informed that this is a telemedicine visit and that the visit is being conducted through [address] [place] [address] [facility] [employer] and patient was informed that this is a secure, HIPAA-compliant platform  He agrees to proceed     My office door was closed  No one else was in the room  He acknowledged consent and understanding of privacy and security of the video platform  The patient has agreed to participate and understands they can discontinue the visit at any time  Patient is aware this is a billable service  Living Well with Diabetes Group Class #3    Suzanne Starks attended the Living Well with Diabetes Group Class #3  During class, Raad Andino was instructed on the following topics: Oral and injectable medications, short term complications of diabetes, long term complications of diabetes, prevention of complications, foot care, sick day management, stress management, and traveling with diabetes   Raad Andino participated in group activities  Christelle Parker will follow up with class #4  Will call with any questions or concerns prior to next session  The patient's history was reviewed and updated as appropriate: allergies, current medications  Lab Results   Component Value Date    HGBA1C 7 5 (H) 11/17/2021       Diabetes Education Record  Christelle Parker was provided Living Well with Diabetes Class #3 book- sent via email      Patient response to instruction    Comprehensiongood  Motivationgood  Expected Compliancegood    Begin Time: 6pm  End Time: 8pm  Referring Provider: Crow Giron    Thank you for referring your patient to [facility], it was a pleasure working with them today  Please feel free to call with any questions or concerns  Zafar Hawk, RD  [address] [facility] [address] [phone]      Inna Cowan is a 45 y o  male see notes above         HPI     Past Medical History:   Diagnosis Date    Allergic     Anxiety     Asthma     Diabetes mellitus ([address] )     type 2, blood sugar 170 this am    Hyperlipidemia     Hypertension     Lumbosacral ligament sprain 08/24/2013    Last assessed    Obstructive sleep apnea     being fitted for CPap       Past Surgical History:   Procedure Laterality Date    APPENDECTOMY  09/12/2014    Last assessed       Current Outpatient Medications   Medication Sig Dispense Refill    Advair Diskus 500-50 MCG/DOSE inhaler TAKE 1 PUFF BY MOUTH TWICE A DAY 60 blister 3    albuterol (PROVENTIL HFA,VENTOLIN HFA) 90 mcg/act inhaler INHALE 2 PUFFS EVERY 4 (FOUR) HOURS AS NEEDED FOR WHEEZING OR SHORTNESS OF BREATH 18 g 3    ALPRAZolam (NIRAVAM) 0 5 MG dissolvable tablet Take 1 tablet (0 5 mg total) by mouth as needed (for flying) 15 tablet 0    atorvastatin (LIPITOR) 20 mg tablet Take 1 tablet (20 mg total) by mouth daily 90 tablet 1    cholecalciferol (VITAMIN D3) 1,000 units tablet Take 1,000 Units by mouth daily      EPINEPHrine (EpiPen 2-Drew) 0 3 mg/0 3 mL SOAJ Inject 0 3 mL (0 3 mg total) into a muscle once for 1 dose 2 each 0    metFORMIN (GLUCOPHAGE) 500 mg tablet Take 1 tablet (500 mg total) by mouth 2 (two) times a day with meals 180 tablet 1    montelukast (SINGULAIR) 10 mg tablet TAKE 1 TABLET BY MOUTH EVERY DAY 90 tablet 1    multivitamin (THERAGRAN) TABS Take 1 tablet by mouth daily      olmesartan-hydrochlorothiazide (BENICAR HCT) 40-12 5 MG per tablet TAKE 1 TABLET BY MOUTH EVERY DAY 90 tablet 1    Semaglutide,0 25 or 0 5MG/DOS, 2 MG/1 5ML SOPN Inject 0 25 mg under the skin once a week 3 mL 0     No current facility-administered medications for this visit  Allergies   Allergen Reactions    Penicillins        Review of Systems    Video Exam    There were no vitals filed for this visit  Physical Exam     I spent 120 minutes with patient today in which greater than 50% of the time was spent in counseling/coordination of care regarding diabetes    VIRTUAL VISIT [address] [facility],[address] verbally agrees to participate in [employer]  Pt is aware that [employer] could be limited without vital signs or the ability to perform a full hands-on physical exam  Saman Soriano understands he or the provider may request at any time to terminate the video visit and request the patient to seek care or treatment in person

## 2022-02-23 ENCOUNTER — TELEMEDICINE (OUTPATIENT)
Dept: DIABETES SERVICES | Facility: HOSPITAL | Age: 52
End: 2022-02-23
Payer: COMMERCIAL

## 2022-02-23 DIAGNOSIS — E11.9 CONTROLLED TYPE 2 DIABETES MELLITUS WITHOUT COMPLICATION, WITHOUT LONG-TERM CURRENT USE OF INSULIN (HCC): Primary | Chronic | ICD-10-CM

## 2022-02-23 PROCEDURE — 98962 EDU&TRN PT SLF-MGMT NQHP 5-8: CPT | Performed by: DIETITIAN, REGISTERED

## 2022-02-24 NOTE — PROGRESS NOTES
Virtual Regular Visit    Verification of patient location:    Patient is located in the following state in which I hold an active license [place]      Assessment/Plan:    Problem List Items Addressed This Visit     None               Reason for visit is   Chief Complaint   Patient presents with    Virtual Regular Visit        Encounter provider Robin Schreiber RD    Provider located at [place] [address] [address] [place] [address]      Recent Visits  No visits were found meeting these conditions  Showing recent visits within past 7 days and meeting all other requirements  Future Appointments  No visits were found meeting these conditions  Showing future appointments within next 150 days and meeting all other requirements       The patient was identified by name and date of birth  Coronadoolga Caban was informed that this is a telemedicine visit and that the visit is being conducted through [employer] and patient was informed that this is a secure, HIPAA-compliant platform  He agrees to proceed     My office door was closed  No one else was in the room  He acknowledged consent and understanding of privacy and security of the video platform  The patient has agreed to participate and understands they can discontinue the visit at any time  Patient is aware this is a billable service  Living Well with Diabetes Group Class #4    Coronadoolga Caban attended the Living Well with Diabetes Group Class #4  During class, Belinda Farris was instructed on the following topics: Types of cholesterol, dietary sources of cholesterol, types of fat, types of fiber, reading food labels- in depth, healthy choices when dining out  Belinda Farris participated in group activities of reading labels together and completed the dining out activity  Belinda Farris will follow up with class #5 or additional DSMT/MNT as desired   Will call with any questions or concerns prior to next session  The patient's history was reviewed and updated as appropriate: allergies, current medications  Lab Results   Component Value Date    HGBA1C 7 5 (H) 11/17/2021       Diabetes Education Record  Cheli Sanchez was provided Living Well with Diabetes Class #4 book      Patient response to instruction    Comprehensiongood  Motivationgood  Expected Compliancegood    Begin Time: 6pm  End Time: 7:30pm  Referring Provider: Tony mccormack for referring your patient to [facility], it was a pleasure working with them today  Please feel free to call with any questions or concerns  Davis David, RD  [address] [facility] [place] [phone]      Inna Aponte is a 45 y o  male see notes above         HPI     Past Medical History:   Diagnosis Date    Allergic     Anxiety     Asthma     Diabetes mellitus ([address] )     type 2, blood sugar 170 this am    Hyperlipidemia     Hypertension     Lumbosacral ligament sprain 08/24/2013    Last assessed    Obstructive sleep apnea     being fitted for CPap       Past Surgical History:   Procedure Laterality Date    APPENDECTOMY  09/12/2014    Last assessed       Current Outpatient Medications   Medication Sig Dispense Refill    Advair Diskus 500-50 MCG/DOSE inhaler TAKE 1 PUFF BY MOUTH TWICE A DAY 60 blister 3    albuterol (PROVENTIL HFA,VENTOLIN HFA) 90 mcg/act inhaler INHALE 2 PUFFS EVERY 4 (FOUR) HOURS AS NEEDED FOR WHEEZING OR SHORTNESS OF BREATH 18 g 3    ALPRAZolam (NIRAVAM) 0 5 MG dissolvable tablet Take 1 tablet (0 5 mg total) by mouth as needed (for flying) 15 tablet 0    atorvastatin (LIPITOR) 20 mg tablet Take 1 tablet (20 mg total) by mouth daily 90 tablet 1    cholecalciferol (VITAMIN D3) 1,000 units tablet Take 1,000 Units by mouth daily      EPINEPHrine (EpiPen 2-Drew) 0 3 mg/0 3 mL SOAJ Inject 0 3 mL (0 3 mg total) into a muscle once for 1 dose 2 each 0    metFORMIN (GLUCOPHAGE) 500 mg tablet Take 1 tablet (500 mg total) by mouth 2 (two) times a day with meals 180 tablet 1    montelukast (SINGULAIR) 10 mg tablet TAKE 1 TABLET BY MOUTH EVERY DAY 90 tablet 1    multivitamin (THERAGRAN) TABS Take 1 tablet by mouth daily      olmesartan-hydrochlorothiazide (BENICAR HCT) 40-12 5 MG per tablet TAKE 1 TABLET BY MOUTH EVERY DAY 90 tablet 1    Semaglutide,0 25 or 0 5MG/DOS, 2 MG/1 5ML SOPN Inject 0 25 mg under the skin once a week 3 mL 0     No current facility-administered medications for this visit  Allergies   Allergen Reactions    Penicillins        Review of Systems    Video Exam    There were no vitals filed for this visit  Physical Exam     I spent 90 minutes with patient today in which greater than 50% of the time was spent in counseling/coordination of care regarding diabetes    VIRTUAL VISIT [address] [facility],[address] verbally agrees to participate in [employer]  Pt is aware that [employer] could be limited without vital signs or the ability to perform a full hands-on physical exam  Saman Abraham understands he or the provider may request at any time to terminate the video visit and request the patient to seek care or treatment in person

## 2022-03-02 DIAGNOSIS — J45.909 ASTHMA, UNSPECIFIED ASTHMA SEVERITY, UNSPECIFIED WHETHER COMPLICATED, UNSPECIFIED WHETHER PERSISTENT: ICD-10-CM

## 2022-03-03 RX ORDER — MONTELUKAST SODIUM 10 MG/1
TABLET ORAL
Qty: 90 TABLET | Refills: 1 | Status: SHIPPED | OUTPATIENT
Start: 2022-03-03

## 2022-03-09 DIAGNOSIS — E11.9 CONTROLLED TYPE 2 DIABETES MELLITUS WITHOUT COMPLICATION, WITHOUT LONG-TERM CURRENT USE OF INSULIN (HCC): ICD-10-CM

## 2022-03-09 RX ORDER — SEMAGLUTIDE 1.34 MG/ML
INJECTION, SOLUTION SUBCUTANEOUS
Qty: 1.5 ML | Refills: 2 | Status: SHIPPED | OUTPATIENT
Start: 2022-03-09 | End: 2022-04-06 | Stop reason: SDUPTHER

## 2022-04-07 ENCOUNTER — TELEPHONE (OUTPATIENT)
Dept: FAMILY MEDICINE CLINIC | Facility: CLINIC | Age: 52
End: 2022-04-07

## 2022-04-07 NOTE — TELEPHONE ENCOUNTER
Pt was prescribed albuterol (PROVENTIL HFA,VENTOLIN HFA) 90 mcg/act inhaler  insurance does not cover it any more    Pt is requesting something different he said he was on a different brand previously

## 2022-04-08 ENCOUNTER — IMMUNIZATIONS (OUTPATIENT)
Dept: FAMILY MEDICINE CLINIC | Facility: HOSPITAL | Age: 52
End: 2022-04-08

## 2022-04-08 DIAGNOSIS — Z23 ENCOUNTER FOR IMMUNIZATION: Primary | ICD-10-CM

## 2022-04-08 PROCEDURE — 91306 COVID-19 MODERNA VACC 0.25 ML BOOSTER: CPT

## 2022-04-08 PROCEDURE — 0064A COVID-19 MODERNA VACC 0.25 ML BOOSTER: CPT

## 2022-05-02 DIAGNOSIS — I10 ESSENTIAL HYPERTENSION: ICD-10-CM

## 2022-05-02 RX ORDER — OLMESARTAN MEDOXOMIL AND HYDROCHLOROTHIAZIDE 40/12.5 40; 12.5 MG/1; MG/1
TABLET ORAL
Qty: 90 TABLET | Refills: 1 | Status: SHIPPED | OUTPATIENT
Start: 2022-05-02

## 2022-05-12 DIAGNOSIS — E78.5 HYPERLIPIDEMIA, UNSPECIFIED HYPERLIPIDEMIA TYPE: ICD-10-CM

## 2022-05-12 DIAGNOSIS — E13.9 DIABETES 1.5, MANAGED AS TYPE 2 (HCC): ICD-10-CM

## 2022-05-13 RX ORDER — ATORVASTATIN CALCIUM 20 MG/1
20 TABLET, FILM COATED ORAL DAILY
Qty: 90 TABLET | Refills: 0 | Status: SHIPPED | OUTPATIENT
Start: 2022-05-13 | End: 2022-08-01

## 2022-08-01 DIAGNOSIS — E78.5 HYPERLIPIDEMIA, UNSPECIFIED HYPERLIPIDEMIA TYPE: ICD-10-CM

## 2022-08-01 DIAGNOSIS — E13.9 DIABETES 1.5, MANAGED AS TYPE 2 (HCC): ICD-10-CM

## 2022-08-01 RX ORDER — ATORVASTATIN CALCIUM 20 MG/1
20 TABLET, FILM COATED ORAL DAILY
Qty: 90 TABLET | Refills: 1 | Status: SHIPPED | OUTPATIENT
Start: 2022-08-01

## 2022-08-17 ENCOUNTER — TELEPHONE (OUTPATIENT)
Dept: FAMILY MEDICINE CLINIC | Facility: CLINIC | Age: 52
End: 2022-08-17

## 2022-08-17 DIAGNOSIS — E13.9 DIABETES 1.5, MANAGED AS TYPE 2 (HCC): Primary | ICD-10-CM

## 2022-08-17 RX ORDER — SEMAGLUTIDE 1.34 MG/ML
0.5 INJECTION, SOLUTION SUBCUTANEOUS WEEKLY
Qty: 4.5 ML | Refills: 3 | Status: SHIPPED | OUTPATIENT
Start: 2022-08-17 | End: 2023-05-23 | Stop reason: SDUPTHER

## 2022-08-17 NOTE — TELEPHONE ENCOUNTER
Received thru [employer] Prior Auth for [employer] but on pts med list says discontinued can you please verify and place order if needed [facility]

## 2022-08-28 DIAGNOSIS — J45.909 ASTHMA, UNSPECIFIED ASTHMA SEVERITY, UNSPECIFIED WHETHER COMPLICATED, UNSPECIFIED WHETHER PERSISTENT: ICD-10-CM

## 2022-08-29 RX ORDER — MONTELUKAST SODIUM 10 MG/1
TABLET ORAL
Qty: 90 TABLET | Refills: 1 | Status: SHIPPED | OUTPATIENT
Start: 2022-08-29

## 2022-10-27 DIAGNOSIS — I10 ESSENTIAL HYPERTENSION: ICD-10-CM

## 2022-10-27 RX ORDER — OLMESARTAN MEDOXOMIL AND HYDROCHLOROTHIAZIDE 40/12.5 40; 12.5 MG/1; MG/1
TABLET ORAL
Qty: 90 TABLET | Refills: 1 | Status: SHIPPED | OUTPATIENT
Start: 2022-10-27

## 2023-01-24 DIAGNOSIS — E13.9 DIABETES 1.5, MANAGED AS TYPE 2 (HCC): ICD-10-CM

## 2023-01-24 DIAGNOSIS — E78.5 HYPERLIPIDEMIA, UNSPECIFIED HYPERLIPIDEMIA TYPE: ICD-10-CM

## 2023-01-24 RX ORDER — ATORVASTATIN CALCIUM 20 MG/1
20 TABLET, FILM COATED ORAL DAILY
Qty: 90 TABLET | Refills: 1 | Status: SHIPPED | OUTPATIENT
Start: 2023-01-24

## 2023-02-18 DIAGNOSIS — J45.909 ASTHMA, UNSPECIFIED ASTHMA SEVERITY, UNSPECIFIED WHETHER COMPLICATED, UNSPECIFIED WHETHER PERSISTENT: ICD-10-CM

## 2023-02-19 RX ORDER — ALBUTEROL SULFATE 90 UG/1
AEROSOL, METERED RESPIRATORY (INHALATION)
Qty: 18 G | Refills: 3 | Status: SHIPPED | OUTPATIENT
Start: 2023-02-19

## 2023-02-21 DIAGNOSIS — T78.2XXD ANAPHYLAXIS, SUBSEQUENT ENCOUNTER: ICD-10-CM

## 2023-02-21 DIAGNOSIS — F41.9 ANXIETY: ICD-10-CM

## 2023-02-21 DIAGNOSIS — J45.909 ASTHMA, UNSPECIFIED ASTHMA SEVERITY, UNSPECIFIED WHETHER COMPLICATED, UNSPECIFIED WHETHER PERSISTENT: ICD-10-CM

## 2023-02-21 RX ORDER — MONTELUKAST SODIUM 10 MG/1
TABLET ORAL
Qty: 90 TABLET | Refills: 1 | Status: SHIPPED | OUTPATIENT
Start: 2023-02-21

## 2023-02-21 RX ORDER — ALPRAZOLAM 0.5 MG/1
0.5 TABLET, ORALLY DISINTEGRATING ORAL AS NEEDED
Qty: 15 TABLET | Refills: 0 | Status: SHIPPED | OUTPATIENT
Start: 2023-02-21

## 2023-02-21 RX ORDER — EPINEPHRINE 0.3 MG/.3ML
0.3 INJECTION SUBCUTANEOUS ONCE
Qty: 2 EACH | Refills: 0 | Status: SHIPPED | OUTPATIENT
Start: 2023-02-21 | End: 2023-02-21

## 2023-04-23 DIAGNOSIS — I10 ESSENTIAL HYPERTENSION: ICD-10-CM

## 2023-04-24 RX ORDER — OLMESARTAN MEDOXOMIL AND HYDROCHLOROTHIAZIDE 40/12.5 40; 12.5 MG/1; MG/1
TABLET ORAL
Qty: 90 TABLET | Refills: 1 | Status: SHIPPED | OUTPATIENT
Start: 2023-04-24

## 2023-05-02 DIAGNOSIS — E13.9 DIABETES 1.5, MANAGED AS TYPE 2 (HCC): ICD-10-CM

## 2023-05-02 DIAGNOSIS — E78.5 HYPERLIPIDEMIA, UNSPECIFIED HYPERLIPIDEMIA TYPE: ICD-10-CM

## 2023-05-02 RX ORDER — ATORVASTATIN CALCIUM 20 MG/1
20 TABLET, FILM COATED ORAL DAILY
Qty: 90 TABLET | Refills: 1 | Status: SHIPPED | OUTPATIENT
Start: 2023-05-02

## 2023-05-08 ENCOUNTER — TELEPHONE (OUTPATIENT)
Dept: FAMILY MEDICINE CLINIC | Facility: CLINIC | Age: 53
End: 2023-05-08

## 2023-05-08 DIAGNOSIS — Z13.0 SCREENING FOR IRON DEFICIENCY ANEMIA: ICD-10-CM

## 2023-05-08 DIAGNOSIS — E78.5 HYPERLIPIDEMIA, UNSPECIFIED HYPERLIPIDEMIA TYPE: ICD-10-CM

## 2023-05-08 DIAGNOSIS — I10 ESSENTIAL HYPERTENSION: ICD-10-CM

## 2023-05-08 DIAGNOSIS — Z13.29 SCREENING FOR THYROID DISORDER: ICD-10-CM

## 2023-05-08 DIAGNOSIS — E13.9 DIABETES 1.5, MANAGED AS TYPE 2 (HCC): Primary | ICD-10-CM

## 2023-05-08 NOTE — TELEPHONE ENCOUNTER
Please call patient to schedule OVL with any provider  He is overdue for a diabetic check up  He needs to have fasting lab work completed prior to appointment (labs are ordered) and is due for diabetic foot exam, and IRIS  Last OV 11/15/21!!

## 2023-05-08 NOTE — TELEPHONE ENCOUNTER
Patient had to go into a meeting will call to schedule it or do it thru [facility]

## 2023-05-23 DIAGNOSIS — E13.9 DIABETES 1.5, MANAGED AS TYPE 2 (HCC): ICD-10-CM

## 2023-06-07 ENCOUNTER — OFFICE VISIT (OUTPATIENT)
Dept: FAMILY MEDICINE CLINIC | Facility: CLINIC | Age: 53
End: 2023-06-07
Payer: COMMERCIAL

## 2023-06-07 VITALS
SYSTOLIC BLOOD PRESSURE: 136 MMHG | BODY MASS INDEX: 33.87 KG/M2 | OXYGEN SATURATION: 97 % | TEMPERATURE: 98.3 F | HEIGHT: 70 IN | HEART RATE: 80 BPM | WEIGHT: 236.6 LBS | DIASTOLIC BLOOD PRESSURE: 90 MMHG

## 2023-06-07 DIAGNOSIS — E78.5 HYPERLIPIDEMIA, UNSPECIFIED HYPERLIPIDEMIA TYPE: ICD-10-CM

## 2023-06-07 DIAGNOSIS — G47.33 OBSTRUCTIVE SLEEP APNEA SYNDROME: ICD-10-CM

## 2023-06-07 DIAGNOSIS — E13.9 DIABETES 1.5, MANAGED AS TYPE 2 (HCC): Primary | ICD-10-CM

## 2023-06-07 DIAGNOSIS — I10 PRIMARY HYPERTENSION: ICD-10-CM

## 2023-06-07 DIAGNOSIS — Z13.0 SCREENING FOR DEFICIENCY ANEMIA: ICD-10-CM

## 2023-06-07 DIAGNOSIS — Z12.5 SCREENING FOR PROSTATE CANCER: ICD-10-CM

## 2023-06-07 DIAGNOSIS — Z23 NEED FOR PNEUMOCOCCAL VACCINATION: ICD-10-CM

## 2023-06-07 DIAGNOSIS — E11.9 CONTROLLED TYPE 2 DIABETES MELLITUS WITHOUT COMPLICATION, WITHOUT LONG-TERM CURRENT USE OF INSULIN (HCC): Chronic | ICD-10-CM

## 2023-06-07 DIAGNOSIS — Z00.00 ANNUAL PHYSICAL EXAM: ICD-10-CM

## 2023-06-07 LAB
CREAT UR-MCNC: 157 MG/DL
LEFT EYE DIABETIC RETINOPATHY: NORMAL
LEFT EYE IMAGE QUALITY: NORMAL
LEFT EYE MACULAR EDEMA: NORMAL
LEFT EYE OTHER RETINOPATHY: NORMAL
MICROALBUMIN UR-MCNC: 14.3 MG/L (ref 0–20)
MICROALBUMIN/CREAT 24H UR: 9 MG/G CREATININE (ref 0–30)
RIGHT EYE DIABETIC RETINOPATHY: NORMAL
RIGHT EYE IMAGE QUALITY: NORMAL
RIGHT EYE MACULAR EDEMA: NORMAL
RIGHT EYE OTHER RETINOPATHY: NORMAL
SEVERITY (EYE EXAM): NORMAL
SL AMB POCT HEMOGLOBIN AIC: 6.3 (ref ?–6.5)

## 2023-06-07 PROCEDURE — 90471 IMMUNIZATION ADMIN: CPT | Performed by: FAMILY MEDICINE

## 2023-06-07 PROCEDURE — 83036 HEMOGLOBIN GLYCOSYLATED A1C: CPT | Performed by: FAMILY MEDICINE

## 2023-06-07 PROCEDURE — 82043 UR ALBUMIN QUANTITATIVE: CPT | Performed by: FAMILY MEDICINE

## 2023-06-07 PROCEDURE — 90677 PCV20 VACCINE IM: CPT | Performed by: FAMILY MEDICINE

## 2023-06-07 PROCEDURE — 99396 PREV VISIT EST AGE 40-64: CPT | Performed by: FAMILY MEDICINE

## 2023-06-07 PROCEDURE — 82570 ASSAY OF URINE CREATININE: CPT | Performed by: FAMILY MEDICINE

## 2023-06-07 PROCEDURE — 92250 FUNDUS PHOTOGRAPHY W/I&R: CPT | Performed by: FAMILY MEDICINE

## 2023-06-07 NOTE — PROGRESS NOTES
[facility]    NAME: Arabella Lua  AGE: 47 y o  SEX: male  : 1970     DATE: 2023     Assessment and Plan:     Patient was seen for annual physical exam   Overall he is doing fairly well  Hemoglobin A1c done in the office today was good at 6 3  Taking metformin and Ozempic  Will increase Ozempic slightly for better diabetic control and for additional weight loss  Blood pressure mildly elevated though on second reading was acceptable  Continue olmesartan  All other medications will remain the same  Update immunizations with Prevnar vaccine today  Physical examination was unremarkable including foot exam       Problem List Items Addressed This Visit     Controlled type 2 diabetes mellitus without complication, without long-term current use of insulin (HCC) (Chronic)    Relevant Medications    semaglutide, 1 mg/dose, (Ozempic) 4 mg/3 mL injection pen    Obstructive sleep apnea syndrome    Hypertension    Hyperlipidemia    Relevant Orders    Comprehensive metabolic panel    Lipid Panel with Direct LDL reflex    TSH, 3rd generation   Other Visit Diagnoses     Diabetes 1 5, managed as type 2 ([facility] )    -  Primary    Relevant Medications    semaglutide, 1 mg/dose, (Ozempic) 4 mg/3 mL injection pen    Other Relevant Orders    Albumin / creatinine urine ratio    POCT hemoglobin A1c (Completed)    IRIS Diabetic eye exam    Need for pneumococcal vaccination        Relevant Orders    Pneumococcal Conjugate Vaccine 20-valent (Pcv20)    Screening for prostate cancer        Relevant Orders    PSA, total and free    Screening for deficiency anemia        Relevant Orders    CBC and differential    Annual physical exam              Immunizations and preventive care screenings were discussed with patient today  Appropriate education was printed on patient's after visit summary      Discussed risks and benefits of prostate cancer screening  We discussed the controversial history of PSA screening for prostate cancer in the [place] as well as the risk of over detection and over treatment of prostate cancer by way of PSA screening  The patient understands that PSA blood testing is an imperfect way to screen for prostate cancer and that elevated PSA levels in the blood may also be caused by infection, inflammation, prostatic trauma or manipulation, urological procedures, or by benign prostatic enlargement  The role of the digital rectal examination in prostate cancer screening was also discussed and I discussed with him that there is large interobserver variability in the findings of digital rectal examination  Counseling:  Alcohol/drug use: discussed moderation in alcohol intake, the recommendations for healthy alcohol use, and avoidance of illicit drug use  Dental Health: discussed importance of regular tooth brushing, flossing, and dental visits  Injury prevention: discussed safety/seat belts, safety helmets, smoke detectors, carbon dioxide detectors, and smoking near bedding or upholstery  Exercise: the importance of regular exercise/physical activity was discussed  Recommend exercise 3-5 times per week for at least 30 minutes  BMI Counseling: Body mass index is 34 26 kg/m²  The BMI is above normal  Nutrition recommendations include decreasing portion sizes, encouraging healthy choices of fruits and vegetables, consuming healthier snacks, limiting drinks that contain sugar, moderation in carbohydrate intake and increasing intake of lean protein  Exercise recommendations include exercising 3-5 times per week  No pharmacotherapy was ordered  Rationale for BMI follow-up plan is due to patient being overweight or obese  Depression Screening and Follow-up Plan: Patient was screened for depression during today's encounter  They screened negative with a PHQ-2 score of 0          Return in about 1 year (around 6/7/2024) for Annual physical      Chief Complaint:     Chief Complaint   Patient presents with   • Physical Exam   • Diabetes      History of Present Illness:     Adult Annual Physical   Patient here for a comprehensive physical exam  The patient reports no problems  Diet and Physical Activity  Diet/Nutrition: diabetic diet and limited fruits/vegetables  Exercise: no formal exercise  Depression Screening  PHQ-2/9 Depression Screening    Little interest or pleasure in doing things: 0 - not at all  Feeling down, depressed, or hopeless: 0 - not at all  PHQ-2 Score: 0  PHQ-2 Interpretation: Negative depression screen       General Health  Sleep: gets more than 8 hours of sleep on average  CPAP  Hearing: normal - bilateral   Vision: most recent eye exam >1 year ago and wears glasses  Dental: no dental visits for >1 year   Health  Symptoms include: none     Review of Systems:     Review of Systems   Constitutional: Negative  HENT: Negative  Negative for congestion, ear pain, hearing loss, nosebleeds, sore throat and trouble swallowing  Eyes: Negative  Respiratory: Negative for apnea, cough, chest tightness, shortness of breath and wheezing  Cardiovascular: Negative  Gastrointestinal: Negative for abdominal pain, blood in stool, constipation, diarrhea, nausea and vomiting  Endocrine: Negative  Genitourinary: Negative for difficulty urinating, dysuria, frequency, hematuria and urgency  Musculoskeletal: Negative for arthralgias, joint swelling and myalgias  Skin: Negative for rash  Neurological: Negative for dizziness, syncope, light-headedness, numbness and headaches  Hematological: Negative  Psychiatric/Behavioral: Negative for confusion, dysphoric mood and sleep disturbance  The patient is not nervous/anxious         Past Medical History:     Past Medical History:   Diagnosis Date   • Allergic    • Anxiety    • Asthma    • Diabetes [facility] ([facility] )     type 2, blood sugar 170 this am • Hyperlipidemia    • Hypertension    • Lumbosacral ligament sprain 08/24/2013    Last assessed   • Obstructive sleep apnea     being fitted for CPap      Past Surgical History:     Past Surgical History:   Procedure Laterality Date   • APPENDECTOMY  09/12/2014    Last assessed      Family History:     Family History   Problem Relation Age of Onset   • Diabetes Father    • Heart disease Father    • Hypertension Father    • Hyperlipidemia Father    • Allergic rhinitis Family    • Asthma Family    • Eczema Family    • Hypertension Family    • Stroke Family    • Hypertension Mother       Social History:     Social History     Socioeconomic History   • Marital status: Single     Spouse name: None   • Number of children: None   • Years of education: None   • Highest education level: None   Occupational History   • None   Tobacco Use   • Smoking status: Never   • Smokeless tobacco: Never   Vaping Use   • Vaping Use: Never used   Substance and Sexual Activity   • Alcohol use:  Yes     Alcohol/week: 2 0 - 4 0 standard drinks of alcohol     Types: 1 - 2 Glasses of wine, 1 - 2 Shots of liquor per week   • Drug use: No   • Sexual activity: Yes     Partners: Female     Birth control/protection: None   Other Topics Concern   • None   Social History Narrative   • None     Social Determinants of Health     Financial Resource Strain: Not on file   Food Insecurity: Not on file   Transportation Needs: Not on file   Physical Activity: Not on file   Stress: Not on file   Social Connections: Not on file   Intimate Partner Violence: Not on file   Housing Stability: Not on file      Current Medications:     Current Outpatient Medications   Medication Sig Dispense Refill   • Advair Diskus 500-50 MCG/DOSE inhaler TAKE 1 PUFF BY MOUTH TWICE A DAY 60 blister 3   • albuterol (PROVENTIL HFA,VENTOLIN HFA) 90 mcg/act inhaler INHALE 2 PUFFS EVERY 4 (FOUR) HOURS AS NEEDED FOR WHEEZING OR SHORTNESS OF BREATH 18 g 3   • ALPRAZolam (NIRAVAM) 0 5 MG "dissolvable tablet Take 1 tablet (0 5 mg total) by mouth as needed (for flying) 15 tablet 0   • atorvastatin (LIPITOR) 20 mg tablet TAKE 1 TABLET (20 MG TOTAL) BY MOUTH IN THE MORNING 90 tablet 1   • cholecalciferol (VITAMIN D3) 1,000 units tablet Take 1,000 Units by mouth daily     • metFORMIN (GLUCOPHAGE) 500 mg tablet TAKE 1 TABLET BY MOUTH TWICE A DAY WITH MEALS 180 tablet 1   • montelukast (SINGULAIR) 10 mg tablet TAKE 1 TABLET BY MOUTH EVERY DAY 90 tablet 1   • multivitamin (THERAGRAN) TABS Take 1 tablet by mouth daily     • olmesartan-hydrochlorothiazide (BENICAR HCT) 40-12 5 MG per tablet TAKE 1 TABLET BY MOUTH EVERY DAY 90 tablet 1   • semaglutide, 1 mg/dose, (Ozempic) 4 mg/3 mL injection pen Inject 0 75 mL (1 mg total) under the skin once a week 9 mL 1   • albuterol (PROVENTIL HFA,VENTOLIN HFA) 90 mcg/act inhaler INHALE 2 PUFFS EVERY 6 HOURS AS NEEDED FOR WHEEZING (Patient not taking: Reported on 6/7/2023) 18 g 3   • EPINEPHrine (EpiPen 2-Drew) 0 3 mg/0 3 mL SOAJ Inject 0 3 mL (0 3 mg total) into a muscle once for 1 dose 2 each 0     No current facility-administered medications for this visit  Allergies: Allergies   Allergen Reactions   • Penicillins       Physical Exam:     /90   Pulse 80   Temp 98 3 °F (36 8 °C)   Ht 5' 9 69\" (1 77 m)   Wt 107 kg (236 lb 9 6 oz)   SpO2 97%   BMI 34 26 kg/m²     Physical Exam  Vitals and nursing note reviewed  Constitutional:       Appearance: He is well-developed  HENT:      Head: Normocephalic  Right Ear: External ear normal       Left Ear: External ear normal       Nose: Nose normal    Eyes:      Conjunctiva/sclera: Conjunctivae normal       Pupils: Pupils are equal, round, and reactive to light  Cardiovascular:      Rate and Rhythm: Normal rate and regular rhythm  Heart sounds: Normal heart sounds  Pulmonary:      Effort: Pulmonary effort is normal       Breath sounds: Normal breath sounds     Abdominal:      General: Bowel sounds " are normal       Palpations: Abdomen is soft  Musculoskeletal:         General: Normal range of motion  Cervical back: Normal range of motion and neck supple  Skin:     General: Skin is warm and dry  Psychiatric:         Behavior: Behavior normal          Thought Content:  Thought content normal          Judgment: Judgment normal           Paresh Pacheco, DO  [facility]

## 2023-06-07 NOTE — PROGRESS NOTES
Name: Annie Peralta      : 1970      MRN: [id number]  Encounter Provider: Tim Marin DO  Encounter Date: 2023   Encounter department: [address] [place] [address]  Diabetes 1 5, managed as type 2 ([facility] )  -     Albumin / creatinine urine ratio  -     POCT hemoglobin A1c  -     IRIS Diabetic eye exam    2  Need for pneumococcal vaccination    3  Controlled type 2 diabetes mellitus without complication, without long-term current use of insulin ([facility] )    4  Hyperlipidemia, unspecified hyperlipidemia type    5  Primary hypertension    6   Obstructive sleep apnea syndrome           Subjective      HPI  Review of Systems    Current Outpatient Medications on File Prior to Visit   Medication Sig   • Advair Diskus 500-50 MCG/DOSE inhaler TAKE 1 PUFF BY MOUTH TWICE A DAY   • albuterol (PROVENTIL HFA,VENTOLIN HFA) 90 mcg/act inhaler INHALE 2 PUFFS EVERY 4 (FOUR) HOURS AS NEEDED FOR WHEEZING OR SHORTNESS OF BREATH   • ALPRAZolam (NIRAVAM) 0 5 MG dissolvable tablet Take 1 tablet (0 5 mg total) by mouth as needed (for flying)   • atorvastatin (LIPITOR) 20 mg tablet TAKE 1 TABLET (20 MG TOTAL) BY MOUTH IN THE MORNING   • cholecalciferol (VITAMIN D3) 1,000 units tablet Take 1,000 Units by mouth daily   • metFORMIN (GLUCOPHAGE) 500 mg tablet TAKE 1 TABLET BY MOUTH TWICE A DAY WITH MEALS   • montelukast (SINGULAIR) 10 mg tablet TAKE 1 TABLET BY MOUTH EVERY DAY   • multivitamin (THERAGRAN) TABS Take 1 tablet by mouth daily   • olmesartan-hydrochlorothiazide (BENICAR HCT) 40-12 5 MG per tablet TAKE 1 TABLET BY MOUTH EVERY DAY   • semaglutide, 0 25 or 0 5 mg/dose, (Ozempic, 0 25 or 0 5 MG/DOSE,) 2 mg/1 5 mL injection pen Inject 0 38 mL (0 5 mg total) under the skin once a week   • albuterol (PROVENTIL HFA,VENTOLIN HFA) 90 mcg/act inhaler INHALE 2 PUFFS EVERY 6 HOURS AS NEEDED FOR WHEEZING (Patient not taking: Reported on 2023)   • EPINEPHrine (EpiPen 2-Drew) 0 3 mg/0 3 mL SOAJ "Inject 0 3 mL (0 3 mg total) into a muscle once for 1 dose       Objective     BP (!) 142/108 (BP Location: Right arm, Patient Position: Sitting, Cuff Size: Large)   Pulse 80   Temp 98 3 °F (36 8 °C)   Ht 5' 9 69\" (1 77 m)   Wt 107 kg (236 lb 9 6 oz)   SpO2 97%   BMI 34 26 kg/m²     Physical Exam  Marcia Ego, DO  "

## 2023-06-08 ENCOUNTER — TELEPHONE (OUTPATIENT)
Dept: FAMILY MEDICINE CLINIC | Facility: CLINIC | Age: 53
End: 2023-06-08

## 2023-06-08 LAB
ALBUMIN SERPL-MCNC: 4.5 G/DL (ref 3.6–5.1)
ALBUMIN/GLOB SERPL: 1.4 (CALC) (ref 1–2.5)
ALP SERPL-CCNC: 53 U/L (ref 35–144)
ALT SERPL-CCNC: 101 U/L (ref 9–46)
AST SERPL-CCNC: 41 U/L (ref 10–35)
BASOPHILS # BLD AUTO: 73 CELLS/UL (ref 0–200)
BASOPHILS NFR BLD AUTO: 0.9 %
BILIRUB SERPL-MCNC: 0.6 MG/DL (ref 0.2–1.2)
BUN SERPL-MCNC: 17 MG/DL (ref 7–25)
BUN/CREAT SERPL: ABNORMAL (CALC) (ref 6–22)
CALCIUM SERPL-MCNC: 9.8 MG/DL (ref 8.6–10.3)
CHLORIDE SERPL-SCNC: 101 MMOL/L (ref 98–110)
CHOLEST SERPL-MCNC: 135 MG/DL
CHOLEST/HDLC SERPL: 3.8 (CALC)
CO2 SERPL-SCNC: 25 MMOL/L (ref 20–32)
CREAT SERPL-MCNC: 0.84 MG/DL (ref 0.7–1.3)
EOSINOPHIL # BLD AUTO: 146 CELLS/UL (ref 15–500)
EOSINOPHIL NFR BLD AUTO: 1.8 %
ERYTHROCYTE [DISTWIDTH] IN BLOOD BY AUTOMATED COUNT: 13.1 % (ref 11–15)
GFR/BSA.PRED SERPLBLD CYS-BASED-ARV: 104 ML/MIN/1.73M2
GLOBULIN SER CALC-MCNC: 3.2 G/DL (CALC) (ref 1.9–3.7)
GLUCOSE SERPL-MCNC: 122 MG/DL (ref 65–99)
HCT VFR BLD AUTO: 46.9 % (ref 38.5–50)
HDLC SERPL-MCNC: 36 MG/DL
HGB BLD-MCNC: 15.5 G/DL (ref 13.2–17.1)
LDLC SERPL CALC-MCNC: 72 MG/DL (CALC)
LYMPHOCYTES # BLD AUTO: 1960 CELLS/UL (ref 850–3900)
LYMPHOCYTES NFR BLD AUTO: 24.2 %
MCH RBC QN AUTO: 29.1 PG (ref 27–33)
MCHC RBC AUTO-ENTMCNC: 33 G/DL (ref 32–36)
MCV RBC AUTO: 88.2 FL (ref 80–100)
MONOCYTES # BLD AUTO: 567 CELLS/UL (ref 200–950)
MONOCYTES NFR BLD AUTO: 7 %
NEUTROPHILS # BLD AUTO: 5354 CELLS/UL (ref 1500–7800)
NEUTROPHILS NFR BLD AUTO: 66.1 %
NONHDLC SERPL-MCNC: 99 MG/DL (CALC)
PLATELET # BLD AUTO: 152 THOUSAND/UL (ref 140–400)
PMV BLD REES-ECKER: 11 FL (ref 7.5–12.5)
POTASSIUM SERPL-SCNC: 3.8 MMOL/L (ref 3.5–5.3)
PROT SERPL-MCNC: 7.7 G/DL (ref 6.1–8.1)
PSA FREE MFR SERPL: 60 % (CALC)
PSA FREE SERPL-MCNC: 0.3 NG/ML
PSA SERPL-MCNC: 0.5 NG/ML
RBC # BLD AUTO: 5.32 MILLION/UL (ref 4.2–5.8)
SODIUM SERPL-SCNC: 136 MMOL/L (ref 135–146)
TRIGL SERPL-MCNC: 200 MG/DL
TSH SERPL-ACNC: 1.98 MIU/L (ref 0.4–4.5)
WBC # BLD AUTO: 8.1 THOUSAND/UL (ref 3.8–10.8)

## 2023-08-17 DIAGNOSIS — J45.909 ASTHMA, UNSPECIFIED ASTHMA SEVERITY, UNSPECIFIED WHETHER COMPLICATED, UNSPECIFIED WHETHER PERSISTENT: ICD-10-CM

## 2023-08-17 RX ORDER — MONTELUKAST SODIUM 10 MG/1
TABLET ORAL
Qty: 90 TABLET | Refills: 1 | Status: SHIPPED | OUTPATIENT
Start: 2023-08-17

## 2023-10-10 DIAGNOSIS — E11.9 CONTROLLED TYPE 2 DIABETES MELLITUS WITHOUT COMPLICATION, WITHOUT LONG-TERM CURRENT USE OF INSULIN (HCC): Chronic | ICD-10-CM

## 2023-10-10 RX ORDER — SEMAGLUTIDE 1.34 MG/ML
INJECTION, SOLUTION SUBCUTANEOUS
Qty: 9 ML | Refills: 3 | Status: SHIPPED | OUTPATIENT
Start: 2023-10-10

## 2023-10-21 DIAGNOSIS — I10 ESSENTIAL HYPERTENSION: ICD-10-CM

## 2023-10-23 RX ORDER — OLMESARTAN MEDOXOMIL AND HYDROCHLOROTHIAZIDE 40/12.5 40; 12.5 MG/1; MG/1
TABLET ORAL
Qty: 90 TABLET | Refills: 1 | Status: SHIPPED | OUTPATIENT
Start: 2023-10-23

## 2023-12-05 LAB
LEFT EYE DIABETIC RETINOPATHY: NORMAL
RIGHT EYE DIABETIC RETINOPATHY: NORMAL
SEVERITY (EYE EXAM): NORMAL

## 2023-12-27 DIAGNOSIS — F41.9 ANXIETY: ICD-10-CM

## 2023-12-27 DIAGNOSIS — E11.9 CONTROLLED TYPE 2 DIABETES MELLITUS WITHOUT COMPLICATION, WITHOUT LONG-TERM CURRENT USE OF INSULIN (HCC): Chronic | ICD-10-CM

## 2023-12-27 RX ORDER — ALPRAZOLAM 0.5 MG/1
0.5 TABLET, ORALLY DISINTEGRATING ORAL AS NEEDED
Qty: 15 TABLET | Refills: 0 | Status: SHIPPED | OUTPATIENT
Start: 2023-12-27

## 2024-01-08 DIAGNOSIS — E78.5 HYPERLIPIDEMIA, UNSPECIFIED HYPERLIPIDEMIA TYPE: ICD-10-CM

## 2024-01-08 DIAGNOSIS — E13.9 DIABETES 1.5, MANAGED AS TYPE 2 (HCC): ICD-10-CM

## 2024-01-08 RX ORDER — ATORVASTATIN CALCIUM 20 MG/1
20 TABLET, FILM COATED ORAL DAILY
Qty: 90 TABLET | Refills: 1 | Status: SHIPPED | OUTPATIENT
Start: 2024-01-08

## 2024-01-30 ENCOUNTER — VBI (OUTPATIENT)
Dept: ADMINISTRATIVE | Facility: OTHER | Age: 54
End: 2024-01-30

## 2024-02-20 DIAGNOSIS — J45.909 ASTHMA, UNSPECIFIED ASTHMA SEVERITY, UNSPECIFIED WHETHER COMPLICATED, UNSPECIFIED WHETHER PERSISTENT: ICD-10-CM

## 2024-02-20 RX ORDER — MONTELUKAST SODIUM 10 MG/1
TABLET ORAL
Qty: 90 TABLET | Refills: 1 | Status: SHIPPED | OUTPATIENT
Start: 2024-02-20

## 2024-02-27 DIAGNOSIS — T78.2XXD ANAPHYLAXIS, SUBSEQUENT ENCOUNTER: ICD-10-CM

## 2024-02-27 RX ORDER — EPINEPHRINE 0.3 MG/.3ML
0.3 INJECTION SUBCUTANEOUS ONCE
Qty: 2 EACH | Refills: 0 | Status: SHIPPED | OUTPATIENT
Start: 2024-02-27 | End: 2024-02-27

## 2024-02-28 ENCOUNTER — TELEPHONE (OUTPATIENT)
Dept: FAMILY MEDICINE CLINIC | Facility: CLINIC | Age: 54
End: 2024-02-28

## 2024-03-12 DIAGNOSIS — J45.909 ASTHMA, UNSPECIFIED ASTHMA SEVERITY, UNSPECIFIED WHETHER COMPLICATED, UNSPECIFIED WHETHER PERSISTENT: ICD-10-CM

## 2024-03-13 RX ORDER — ALBUTEROL SULFATE 90 UG/1
AEROSOL, METERED RESPIRATORY (INHALATION)
Qty: 18 G | Refills: 1 | Status: SHIPPED | OUTPATIENT
Start: 2024-03-13

## 2024-04-17 DIAGNOSIS — I10 ESSENTIAL HYPERTENSION: ICD-10-CM

## 2024-04-18 RX ORDER — OLMESARTAN MEDOXOMIL AND HYDROCHLOROTHIAZIDE 40/12.5 40; 12.5 MG/1; MG/1
TABLET ORAL
Qty: 90 TABLET | Refills: 1 | Status: SHIPPED | OUTPATIENT
Start: 2024-04-18 | End: 2024-04-22 | Stop reason: SDUPTHER

## 2024-04-22 DIAGNOSIS — I10 ESSENTIAL HYPERTENSION: ICD-10-CM

## 2024-04-22 DIAGNOSIS — E13.9 DIABETES 1.5, MANAGED AS TYPE 2 (HCC): ICD-10-CM

## 2024-04-22 DIAGNOSIS — E11.9 CONTROLLED TYPE 2 DIABETES MELLITUS WITHOUT COMPLICATION, WITHOUT LONG-TERM CURRENT USE OF INSULIN (HCC): Chronic | ICD-10-CM

## 2024-04-22 DIAGNOSIS — E78.5 HYPERLIPIDEMIA, UNSPECIFIED HYPERLIPIDEMIA TYPE: ICD-10-CM

## 2024-04-22 DIAGNOSIS — J45.909 ASTHMA, UNSPECIFIED ASTHMA SEVERITY, UNSPECIFIED WHETHER COMPLICATED, UNSPECIFIED WHETHER PERSISTENT: ICD-10-CM

## 2024-04-22 RX ORDER — OLMESARTAN MEDOXOMIL AND HYDROCHLOROTHIAZIDE 40/12.5 40; 12.5 MG/1; MG/1
1 TABLET ORAL DAILY
Qty: 90 TABLET | Refills: 1 | Status: SHIPPED | OUTPATIENT
Start: 2024-04-22

## 2024-04-22 RX ORDER — ALBUTEROL SULFATE 90 UG/1
2 AEROSOL, METERED RESPIRATORY (INHALATION) EVERY 6 HOURS PRN
Qty: 18 G | Refills: 1 | Status: SHIPPED | OUTPATIENT
Start: 2024-04-22

## 2024-04-22 RX ORDER — ATORVASTATIN CALCIUM 20 MG/1
20 TABLET, FILM COATED ORAL DAILY
Qty: 90 TABLET | Refills: 1 | Status: SHIPPED | OUTPATIENT
Start: 2024-04-22

## 2024-04-22 RX ORDER — MONTELUKAST SODIUM 10 MG/1
10 TABLET ORAL DAILY
Qty: 90 TABLET | Refills: 1 | Status: SHIPPED | OUTPATIENT
Start: 2024-04-22

## 2024-04-30 NOTE — TELEPHONE ENCOUNTER
Received refill requests for medications to be sent to mail order service. Called patient to verify. Pt did not want refills going to mail order. Original refills sent to local pharmacy already picked up.

## 2024-05-28 ENCOUNTER — TELEPHONE (OUTPATIENT)
Dept: FAMILY MEDICINE CLINIC | Facility: CLINIC | Age: 54
End: 2024-05-28

## 2024-06-26 ENCOUNTER — TELEPHONE (OUTPATIENT)
Dept: FAMILY MEDICINE CLINIC | Facility: CLINIC | Age: 54
End: 2024-06-26

## 2024-06-26 DIAGNOSIS — Z13.0 SCREENING FOR DEFICIENCY ANEMIA: ICD-10-CM

## 2024-06-26 DIAGNOSIS — Z12.5 SCREENING FOR PROSTATE CANCER: ICD-10-CM

## 2024-06-26 DIAGNOSIS — E78.5 HYPERLIPIDEMIA, UNSPECIFIED HYPERLIPIDEMIA TYPE: ICD-10-CM

## 2024-06-26 DIAGNOSIS — E11.9 CONTROLLED TYPE 2 DIABETES MELLITUS WITHOUT COMPLICATION, WITHOUT LONG-TERM CURRENT USE OF INSULIN (HCC): Primary | ICD-10-CM

## 2024-06-26 NOTE — TELEPHONE ENCOUNTER
----- Message from Nannette HAM sent at 6/26/2024 10:56 AM EDT -----  Regarding: Over due for diabetic follow up  Please call patient to schedule physical and diabetic check up with Dr. Camarena

## 2024-06-26 NOTE — TELEPHONE ENCOUNTER
Patient will be coming in for his physical/diabetic check up on 8/6 and would like blood work put in his chart to discuss at visit.   Patient uses [facility] in [place] Orders can be faxed once completed.

## 2024-07-08 ENCOUNTER — TELEPHONE (OUTPATIENT)
Dept: FAMILY MEDICINE CLINIC | Facility: CLINIC | Age: 54
End: 2024-07-08

## 2024-07-10 ENCOUNTER — APPOINTMENT (OUTPATIENT)
Dept: RADIOLOGY | Facility: MEDICAL CENTER | Age: 54
End: 2024-07-10
Payer: COMMERCIAL

## 2024-07-10 ENCOUNTER — OFFICE VISIT (OUTPATIENT)
Dept: OBGYN CLINIC | Facility: MEDICAL CENTER | Age: 54
End: 2024-07-10
Payer: COMMERCIAL

## 2024-07-10 VITALS
HEART RATE: 60 BPM | DIASTOLIC BLOOD PRESSURE: 80 MMHG | SYSTOLIC BLOOD PRESSURE: 124 MMHG | HEIGHT: 70 IN | BODY MASS INDEX: 32.07 KG/M2 | WEIGHT: 224 LBS

## 2024-07-10 DIAGNOSIS — M47.812 OSTEOARTHRITIS OF CERVICAL SPINE, UNSPECIFIED SPINAL OSTEOARTHRITIS COMPLICATION STATUS: ICD-10-CM

## 2024-07-10 DIAGNOSIS — M54.2 CERVICAL PAIN (NECK): ICD-10-CM

## 2024-07-10 DIAGNOSIS — M54.2 CERVICAL PAIN (NECK): Primary | ICD-10-CM

## 2024-07-10 PROCEDURE — 72040 X-RAY EXAM NECK SPINE 2-3 VW: CPT

## 2024-07-10 PROCEDURE — 99204 OFFICE O/P NEW MOD 45 MIN: CPT | Performed by: EMERGENCY MEDICINE

## 2024-07-10 RX ORDER — METHYLPREDNISOLONE 4 MG/1
TABLET ORAL
Qty: 21 TABLET | Refills: 0 | Status: SHIPPED | OUTPATIENT
Start: 2024-07-10

## 2024-07-10 NOTE — PATIENT INSTRUCTIONS
While taking the oral steroid Medrol Dosepak, do not take any NSAIDs such as Advil, Motrin, ibuprofen, Motrin, Aleve, naproxen, Celebrex or Meloxicam.  You can restart the NSAIDs after you finish the steroids.    However you may take Tylenol 500mg every 4-6 hours as needed OR max 1,000mg per dose up to 3 times per day for a total of 3,000mg per day  While taking oral steroids, you may experience mild side effects such as feeling jittery or flushing.  Please call if your side effects are significant or you have any questions.      OR    You may use Advil (ibuprofen) 400-600mg every 6 hours or at least twice per day (OR Aleve (naproxen) 250-500mg every 12 hours as needed for pain and inflammation).  However do not mix or take other NSAIDs together such as Advil, Motrin, ibuprofen, Celebrex, naproxen, diclofenac or Aleve.    You may also take Tylenol (acetaminophen) together with Advil (ibuprofen) or Aleve (naproxen) as this is safe and can help decrease your pain levels.  The dosing for Tylenol is 500mg every 4-6 hours as needed OR max 1,000mg per dose up to 3 times per day for a total of 3,000mg per day  *Check with your primary care physician to see if these medications are safe to take and to make sure they do not interfere with your other medications and medical issues.

## 2024-07-10 NOTE — PROGRESS NOTES
Assessment/Plan:    Diagnoses and all orders for this visit:    Cervical pain (neck)  -     XR spine cervical 2 or 3 vw injury; Future  -     Ambulatory Referral to Physical Therapy; Future  -     methylprednisolone (MEDROL) 4 mg tablet; Medrol dose pack, take as directed    Osteoarthritis of cervical spine, unspecified spinal osteoarthritis complication status  -     Ambulatory Referral to Physical Therapy; Future  -     methylprednisolone (MEDROL) 4 mg tablet; Medrol dose pack, take as directed    Xrays C spine obtained and reviewed.  If no improvement with Medrol pack and PT, t/c MRI and referral to Pain Management    Return in about 6 weeks (around 8/21/2024).      Subjective:   Patient ID: Saman Lara is a 53 y.o. male.    NP presents for midline deep neck pain since May when he turned and looked to his right one morning in bed.  No radiation of pain, n/t or weakness.  He has been using IBU qHS  Notes hx of a neck injury occurring while mountain biking         Review of Systems    The following portions of the patient's chart were reviewed and updated as appropriate:   Allergy:    Allergies   Allergen Reactions    Penicillins        Medications:    Current Outpatient Medications:     Advair Diskus 500-50 MCG/DOSE inhaler, TAKE 1 PUFF BY MOUTH TWICE A DAY, Disp: 60 blister, Rfl: 3    albuterol (PROVENTIL HFA,VENTOLIN HFA) 90 mcg/act inhaler, INHALE 2 PUFFS EVERY 4 (FOUR) HOURS AS NEEDED FOR WHEEZING OR SHORTNESS OF BREATH, Disp: 18 g, Rfl: 3    albuterol (PROVENTIL HFA,VENTOLIN HFA) 90 mcg/act inhaler, Inhale 2 puffs every 6 (six) hours as needed for wheezing, Disp: 18 g, Rfl: 1    ALPRAZolam (NIRAVAM) 0.5 MG dissolvable tablet, Take 1 tablet (0.5 mg total) by mouth as needed (for flying), Disp: 15 tablet, Rfl: 0    atorvastatin (LIPITOR) 20 mg tablet, Take 1 tablet (20 mg total) by mouth daily, Disp: 90 tablet, Rfl: 1    cholecalciferol (VITAMIN D3) 1,000 units tablet, Take 1,000 Units by mouth  "daily, Disp: , Rfl:     metFORMIN (GLUCOPHAGE) 500 mg tablet, Take 1 tablet (500 mg total) by mouth 2 (two) times a day with meals, Disp: 180 tablet, Rfl: 1    methylprednisolone (MEDROL) 4 mg tablet, Medrol dose pack, take as directed, Disp: 21 tablet, Rfl: 0    montelukast (SINGULAIR) 10 mg tablet, Take 1 tablet (10 mg total) by mouth daily, Disp: 90 tablet, Rfl: 1    multivitamin (THERAGRAN) TABS, Take 1 tablet by mouth daily, Disp: , Rfl:     olmesartan-hydrochlorothiazide (BENICAR HCT) 40-12.5 MG per tablet, Take 1 tablet by mouth daily, Disp: 90 tablet, Rfl: 1    semaglutide, 1 mg/dose, (Ozempic, 1 MG/DOSE,) 4 mg/3 mL injection pen, Inject 0.75 mL (1 mg total) under the skin every 7 days, Disp: 9 mL, Rfl: 1    EPINEPHrine (EpiPen 2-Drew) 0.3 mg/0.3 mL SOAJ, Inject 0.3 mL (0.3 mg total) into a muscle once for 1 dose, Disp: 2 each, Rfl: 0    Patient Active Problem List   Diagnosis    Anxiety    Asthma    Hyperlipidemia    Hypertension    Controlled type 2 diabetes mellitus without complication, without long-term current use of insulin ([facility])    Obstructive sleep apnea syndrome       Objective:  /80   Pulse 60   Ht 5' 9.69\" (1.77 m)   Wt 102 kg (224 lb)   BMI 32.43 kg/m²     Back Exam     Comments:  C spine decreased rotation L and R  B/L UE strength 5/5             Physical Exam  Vitals reviewed.   Constitutional:       Appearance: He is well-developed.   HENT:      Head: Normocephalic and atraumatic.   Eyes:      Conjunctiva/sclera: Conjunctivae normal.   Pulmonary:      Effort: Pulmonary effort is normal.   Musculoskeletal:      Cervical back: Neck supple.   Skin:     General: Skin is warm and dry.   Neurological:      Mental Status: He is alert and oriented to person, place, and time.   Psychiatric:         Behavior: Behavior normal.           Neurologic Exam     Mental Status   Oriented to person, place, and time.       Procedures    I have personally reviewed pertinent films in PACS and my " interpretation is Xrays C spine show left sided uncovertebral hypertrophy, as well as evidence of endplate osteophytes at C5-C6.  No compression fractures, normal alignment.            Past Medical History:   Diagnosis Date    Allergic     Anxiety     Asthma     Diabetes mellitus (HCC)     type 2, blood sugar 170 this am    Hyperlipidemia     Hypertension     Lumbosacral ligament sprain 08/24/2013    Last assessed    Obstructive sleep apnea     being fitted for CPap       Past Surgical History:   Procedure Laterality Date    APPENDECTOMY  09/12/2014    Last assessed       Social History     Socioeconomic History    Marital status: Single     Spouse name: Not on file    Number of children: Not on file    Years of education: Not on file    Highest education level: Not on file   Occupational History    Not on file   Tobacco Use    Smoking status: Never    Smokeless tobacco: Never   Vaping Use    Vaping status: Never Used   Substance and Sexual Activity    Alcohol use: Yes     Alcohol/week: 2.0 - 4.0 standard drinks of alcohol     Types: 1 - 2 Glasses of wine, 1 - 2 Shots of liquor per week    Drug use: No    Sexual activity: Yes     Partners: Female     Birth control/protection: None   Other Topics Concern    Not on file   Social History Narrative    Not on file     Social Determinants of Health     Financial Resource Strain: Not on file   Food Insecurity: Not on file   Transportation Needs: Not on file   Physical Activity: Not on file   Stress: Not on file   Social Connections: Not on file   Intimate Partner Violence: Not on file   Housing Stability: Not on file       Family History   Problem Relation Age of Onset    Diabetes Father     Heart disease Father     Hypertension Father     Hyperlipidemia Father     Allergic rhinitis Family     Asthma Family     Eczema Family     Hypertension Family     Stroke Family     Hypertension Mother

## 2024-07-16 ENCOUNTER — EVALUATION (OUTPATIENT)
Dept: PHYSICAL THERAPY | Facility: CLINIC | Age: 54
End: 2024-07-16
Payer: COMMERCIAL

## 2024-07-16 DIAGNOSIS — M54.2 CERVICAL PAIN (NECK): ICD-10-CM

## 2024-07-16 DIAGNOSIS — M47.812 OSTEOARTHRITIS OF CERVICAL SPINE, UNSPECIFIED SPINAL OSTEOARTHRITIS COMPLICATION STATUS: ICD-10-CM

## 2024-07-16 PROCEDURE — 97161 PT EVAL LOW COMPLEX 20 MIN: CPT

## 2024-07-16 PROCEDURE — 97110 THERAPEUTIC EXERCISES: CPT

## 2024-07-16 NOTE — PROGRESS NOTES
PT Evaluation     Today's date: 2024  Patient name: Saman Lara  : 1970  MRN: [id number]  Referring provider: Shimon Murphy MD  Dx:   Encounter Diagnosis     ICD-10-CM    1. Cervical pain (neck)  M54.2 Ambulatory Referral to Physical Therapy     PT plan of care cert/re-cert      2. Osteoarthritis of cervical spine, unspecified spinal osteoarthritis complication status  M47.812 Ambulatory Referral to Physical Therapy     PT plan of care cert/re-cert          Start Time: 730  Stop Time: 810  Total time in clinic (min): 40 minutes    Assessment  Impairments: abnormal coordination, abnormal muscle firing, abnormal muscle tone, abnormal or restricted ROM, abnormal movement, activity intolerance, impaired physical strength, lacks appropriate home exercise program, pain with function and poor posture   Functional limitations: decreased AROM tolerance, decreased ability to maintain prolonged postures, difficulty with lifting OH  Symptom irritability: moderate    Assessment details: Saman Lara is a 53 y.o. male presenting to physical therapy today for s/s consistent with upper thoracic and lower cervical pain..    Current objective impairments:increased tone throughout cervical/thoracic paraspinals, decreased cervical AROM into rotation and lateral flexion with increased pain, limited thoracic joint mobility in upper thoracic spine with reproduction of pain with  through T2-6. Pt with decreased DNF endurance with fatigue in 6s and postural impairments of forward head and rounded shoulders. Provocative testing of distraction relieving pain indicating compressive lesion in thoracic spine.     Response to today's tx: good, educated and completed HEP     Pt will benefit from skilled physical therapy to address above listed impairments and return to PLOF.   Understanding of Dx/Px/POC: good     Prognosis: good    Goals  STG:  Pt will decrease pain to <2/10 with all ADLs in 3 weeks to  improve QoL  2. Pt will increase DNF endurance to 10s maintained contraction in 3 weeks to improve DNF strength and cervical stabilization.    LTG:  Pt will report pain <2/10 with all bending/lifting activities in 6 weeks to return to PLOF  2. Pt will improve cervical AROM with no reports of pain in  weeks to improve movement tolerance.  3. Pt will demonstrate normal cervical joint mobility in 6 weeks.   4. Pt will improve functional status measure from 52 to >68 in order to better complete ADLs.     Plan  Patient would benefit from: skilled physical therapy  Planned modality interventions: low level laser therapy, TENS, thermotherapy: hydrocollator packs and cryotherapy    Planned therapy interventions: IASTM, joint mobilization, kinesiology taping, manual therapy, nerve gliding, neuromuscular re-education, patient education, postural training, self care, strengthening, stretching, therapeutic activities, therapeutic exercise, home exercise program, functional ROM exercises, flexibility, balance/weight bearing training and balance    Frequency: 2x week  Duration in weeks: 6  Plan of Care beginning date: 7/16/2024  Plan of Care expiration date: 8/27/2024  Treatment plan discussed with: patient  Plan details: Pt will benefit from skilled physical therapy to treat aforementioned impairments, decrease pain and return to PLOF.           Subjective Evaluation    History of Present Illness  Date of onset: 5/16/2024  Mechanism of injury: Pt reports BONNIE: woke up to rotate to turn alarm off and caused massively sharp pain. Happened while turning to the right. After, looking left to right got progressively worse.     Previous tx to date: ortho dr took x-rays showing OA and prescribed prednisone/tylenol - can move a little better but has limited motion    24 hour pattern: activity dependent     Red Flags: clear          Not a recurrent problem   Quality of life: good    Patient Goals  Patient goals for therapy: increased  motion, increased strength, decreased pain and return to sport/leisure activities    Pain  Current pain ratin  At best pain ratin  At worst pain ratin  Location: center of lower cervical spine/upper thoracic  Quality: sharp, tight and dull ache  Relieving factors: medications and heat (sitting upright, slower movements)  Aggravating factors: overhead activity and lifting (turning head, driving, prolonged positions, quicker movements)  Progression: improved    Social Support    Employment status: working (full-time, pathology)    Diagnostic Tests  X-ray: abnormal (OA, DDD)  Treatments  Current treatment: physical therapy        Objective     Static Posture     Head  Forward.    Shoulders  Rounded.    Tenderness     Additional Tenderness Details  No TTP    Hypertonicity of bilat cervical paraspinals, UT, LS     Active Range of Motion   Cervical/Thoracic Spine       Cervical  Subcranial protraction:  WFL   Subcranial retraction:  WFL and with pain   Flexion: 54 degrees   Extension: 50 degrees      Left lateral flexion: 26 degrees      Right lateral flexion: 18 degrees     with pain Restriction level minimal  Left rotation: 42 degrees with pain Restriction level: moderate  Right rotation: 50 degrees    with pain Restriction level: moderate    Thoracic    Flexion:  WFL  Extension:  Restriction level: minimal  Left rotation:  WFL  Right rotation:  WFL    Additional Active Range of Motion Details  Repeated flexion: 5/10 prior, no change    Repeated retraction:  5/10 prior, increased pain, moved to L paraspinals    Joint Play   Joints within functional limits: C1, C2, C3 and C4     Hypomobile: C5, C6, C7, T1, T2, T3, T4, T5, T6 and T7     Pain: C7, T1, T2 and T3     Strength/Myotome Testing     Additional Strength Details  DNF endurance 6s to fatigue    Tests   Cervical   Positive cervical distraction test and neck flexor muscle endurance test.  Negative vertical compression.     Lumbar   Negative vertical  compression.              Precautions:   Patient Active Problem List   Diagnosis    Anxiety    Asthma    Hyperlipidemia    Hypertension    Controlled type 2 diabetes mellitus without complication, without long-term current use of insulin (HCC)    Obstructive sleep apnea syndrome           Manuals 7/16            Thoracic              UT STM             Thoracic/cervical paraspinal STM                           Neuro Re-Ed             Chin tuck             DNF             Tband rows/ext             Tband habd             Open book             Quadruped shoulder flx c chin tuck                          Ther Ex             SNAG rotation 10x bilat            SNAG extension 10x            Thoracic butterfly 10x            UT stretch HEP            No monies BTB 10x             UBE             1/2 roll hugs             1/2 roll alt flexion             Tband abd diagonals             Ther Activity                                       Gait Training                                       Modalities

## 2024-07-17 ENCOUNTER — OFFICE VISIT (OUTPATIENT)
Dept: PHYSICAL THERAPY | Facility: CLINIC | Age: 54
End: 2024-07-17
Payer: COMMERCIAL

## 2024-07-17 DIAGNOSIS — M47.812 OSTEOARTHRITIS OF CERVICAL SPINE, UNSPECIFIED SPINAL OSTEOARTHRITIS COMPLICATION STATUS: ICD-10-CM

## 2024-07-17 DIAGNOSIS — M54.2 CERVICAL PAIN (NECK): Primary | ICD-10-CM

## 2024-07-17 PROCEDURE — 97110 THERAPEUTIC EXERCISES: CPT

## 2024-07-17 PROCEDURE — 97140 MANUAL THERAPY 1/> REGIONS: CPT

## 2024-07-17 PROCEDURE — 97112 NEUROMUSCULAR REEDUCATION: CPT

## 2024-07-17 NOTE — PROGRESS NOTES
"Daily Note     Today's date: 2024  Patient name: Saman Lara  : 1970  MRN: [id number]  Referring provider: Shimon Murphy MD  Dx:   Encounter Diagnosis     ICD-10-CM    1. Cervical pain (neck)  M54.2       2. Osteoarthritis of cervical spine, unspecified spinal osteoarthritis complication status  M47.812           Start Time: 0800  Stop Time: 0845  Total time in clinic (min): 45 minutes    Subjective: Pt reports feeling about the same as yesterday. Able to try some of the exercises at home but doesn't think he is doing he DNF correctly.       Objective: See treatment diary below      Assessment: Tolerated treatment well. Patient demonstrated fatigue post treatment and would benefit from continued PT. Crepitus noted in cervical spine with open books to R yet no pain overall. Pt with no increase of pain throughout session today with improvement in thoracic joint mobility with . Pt compensates with cervical protraction during shoulder flexion due to lack of thoracic extension. Mild strain noted with prolonged postural stabilization exercises requiring chin tuck due to decreased DNF endurance.       Plan: Continue per plan of care.  Progress treatment as tolerated.       Precautions:   Patient Active Problem List   Diagnosis    Anxiety    Asthma    Hyperlipidemia    Hypertension    Controlled type 2 diabetes mellitus without complication, without long-term current use of insulin (HCC)    Obstructive sleep apnea syndrome           Manuals            Thoracic   RC gr 3-4           UT STM             Thoracic/cervical paraspinal STM   RC                        Neuro Re-Ed             Chin tuck             Cervical ball rotation  10x bilat           DNF  5\" x10           Tband rows/ext  Blk/red 2x10 ea           Tband habd             Open book  10x bilat            Quadruped shoulder flx c chin tuck  10x bilat           Cervical rot isos  5\" x10 bilat                         Ther Ex  "            SNAG rotation 10x bilat 10x bilat            SNAG extension 10x 10x            Thoracic butterfly 10x 15x            UT stretch HEP            No monies BTB 10x  BTB 20x           UBE  2 min ea           1/2 roll hugs             1/2 roll alt flexion             Tband abd diagonals  C 1/2 roll    GTB 10x bilat           Ther Activity                                       Gait Training                                       Modalities

## 2024-07-22 ENCOUNTER — OFFICE VISIT (OUTPATIENT)
Dept: PHYSICAL THERAPY | Facility: CLINIC | Age: 54
End: 2024-07-22
Payer: COMMERCIAL

## 2024-07-22 DIAGNOSIS — M47.812 OSTEOARTHRITIS OF CERVICAL SPINE, UNSPECIFIED SPINAL OSTEOARTHRITIS COMPLICATION STATUS: Primary | ICD-10-CM

## 2024-07-22 DIAGNOSIS — M54.2 CERVICAL PAIN (NECK): ICD-10-CM

## 2024-07-22 PROCEDURE — 97140 MANUAL THERAPY 1/> REGIONS: CPT

## 2024-07-22 PROCEDURE — 97110 THERAPEUTIC EXERCISES: CPT

## 2024-07-22 PROCEDURE — 97112 NEUROMUSCULAR REEDUCATION: CPT

## 2024-07-22 NOTE — PROGRESS NOTES
"Daily Note     Today's date: 2024  Patient name: Saman Lara  : 1970  MRN: [id number]  Referring provider: Shimon Murphy MD  Dx:   Encounter Diagnosis     ICD-10-CM    1. Osteoarthritis of cervical spine, unspecified spinal osteoarthritis complication status  M47.812       2. Cervical pain (neck)  M54.2           Start Time: 1015  Stop Time: 1100  Total time in clinic (min): 45 minutes    Subjective: Pt reports feeling like he is getting more rotation motion however stiffness is moving up neck more.       Objective: See treatment diary below      Assessment: Tolerated treatment well. Patient demonstrated fatigue post treatment and would benefit from continued PT. Increased tone in cervical paraspinals today along with UT addressed with IASTM with increased redness following however decrease in tone. Improvement in cervical rotation noted with SNAGs today due to improvement in cervical and upper thoracic mobility.       Plan: Continue per plan of care.  Progress treatment as tolerated.       Precautions:   Patient Active Problem List   Diagnosis    Anxiety    Asthma    Hyperlipidemia    Hypertension    Controlled type 2 diabetes mellitus without complication, without long-term current use of insulin (HCC)    Obstructive sleep apnea syndrome           Manuals           Thoracic   RC gr 3-4 RC gr 3-4          UT STM   IASTM + cervical paraspinals RC          Thoracic/cervical paraspinal STM   RC RC                       Neuro Re-Ed             Chin tuck             Cervical ball rotation  10x bilat 10x bilat           DNF  5\" x10 5\" x15          Tband rows/ext  Blk/red 2x10 ea Blk/red 2x10 ea           Tband habd             LT sling   GTB 20x bilat           Open book  10x bilat  10x bilat           Quadruped shoulder flx c chin tuck  10x bilat 10x bilat           Cervical rot isos  5\" x10 bilat                         Ther Ex             SNAG rotation 10x bilat 10x bilat  10x " bilat          SNAG extension 10x 10x  10x           Thoracic butterfly 10x 15x  15x           UT stretch HEP            No monies BTB 10x  BTB 20x BTB 20x           UBE  2 min ea 2 min ea          1/2 roll hugs             1/2 roll alt flexion             Tband abd diagonals  C 1/2 roll    GTB 10x bilat C 1/2 roll    GTB 10x bilat           Ther Activity                                       Gait Training                                       Modalities

## 2024-07-26 ENCOUNTER — OFFICE VISIT (OUTPATIENT)
Dept: PHYSICAL THERAPY | Facility: CLINIC | Age: 54
End: 2024-07-26
Payer: COMMERCIAL

## 2024-07-26 DIAGNOSIS — M47.812 OSTEOARTHRITIS OF CERVICAL SPINE, UNSPECIFIED SPINAL OSTEOARTHRITIS COMPLICATION STATUS: Primary | ICD-10-CM

## 2024-07-26 DIAGNOSIS — M54.2 CERVICAL PAIN (NECK): ICD-10-CM

## 2024-07-26 PROCEDURE — 97110 THERAPEUTIC EXERCISES: CPT | Performed by: PHYSICAL THERAPIST

## 2024-07-26 PROCEDURE — 97112 NEUROMUSCULAR REEDUCATION: CPT | Performed by: PHYSICAL THERAPIST

## 2024-07-26 PROCEDURE — 97140 MANUAL THERAPY 1/> REGIONS: CPT | Performed by: PHYSICAL THERAPIST

## 2024-07-26 NOTE — PROGRESS NOTES
"Daily Note     Today's date: 2024  Patient name: Saman Lara  : 1970  MRN: [id number]  Referring provider: Shimon Murphy MD  Dx:   Encounter Diagnosis     ICD-10-CM    1. Osteoarthritis of cervical spine, unspecified spinal osteoarthritis complication status  M47.812       2. Cervical pain (neck)  M54.2                      Subjective: Pt reports feeling like the pain has migrated superiorly, continues to note bilaterally in upper cervical spine and suboccipital regions.      Objective: See treatment diary below      Assessment: Tolerated treatment well. Patient demonstrated fatigue post treatment and would benefit from continued PT. Pt had good tolerance to manual therapy and demonstrated improved cervical rotation afterward, but still noted some pulling bilaterally. He had some petechiae following IASTM to B UT and cervical paraspinal muscles but denied pain. He was able to display full ROM with addition of supine mobility on foam roll.      Plan: Continue per plan of care.  Progress treatment as tolerated.       Precautions:   Patient Active Problem List   Diagnosis    Anxiety    Asthma    Hyperlipidemia    Hypertension    Controlled type 2 diabetes mellitus without complication, without long-term current use of insulin (HCC)    Obstructive sleep apnea syndrome           Manuals          Thoracic   RC gr 3-4 RC gr 3-4 ED gr 3-4         UT STM   IASTM + cervical paraspinals RC IASTM + cervical PSM ED         Thoracic/cervical paraspinal STM   RC RC nv         Med/inf glides C3-C7    ED         Neuro Re-Ed             Chin tuck             Cervical ball rotation  10x bilat 10x bilat  10x ea B         DNF  5\" x10 5\" x15 nv         Tband rows/ext  Blk/red 2x10 ea Blk/red 2x10 ea  Blk/red 2x10 ea         Tband habd             LT sling   GTB 20x bilat  nv         Open book  10x bilat  10x bilat  nv         Quadruped shoulder flx c chin tuck  10x bilat 10x bilat  10x ea B  " "       Cervical rot isos  5\" x10 bilat   nv                      Ther Ex             SNAG rotation 10x bilat 10x bilat  10x bilat nv         SNAG extension 10x 10x  10x  nv         Thoracic butterfly 10x 15x  15x  nv         UT stretch HEP            No monies BTB 10x  BTB 20x BTB 20x  nv         UBE  2 min ea 2 min ea 2 min ea         1/2 roll hugs    10x ea         1/2 roll alt flexion    10x ea         Tband abd diagonals  C 1/2 roll    GTB 10x bilat C 1/2 roll    GTB 10x bilat  W/ 1/2 roll GTB 10x ea B         Ther Activity                                       Gait Training                                       Modalities                                              "

## 2024-07-30 ENCOUNTER — OFFICE VISIT (OUTPATIENT)
Dept: PHYSICAL THERAPY | Facility: CLINIC | Age: 54
End: 2024-07-30
Payer: COMMERCIAL

## 2024-07-30 DIAGNOSIS — M54.2 CERVICAL PAIN (NECK): ICD-10-CM

## 2024-07-30 DIAGNOSIS — M47.812 OSTEOARTHRITIS OF CERVICAL SPINE, UNSPECIFIED SPINAL OSTEOARTHRITIS COMPLICATION STATUS: Primary | ICD-10-CM

## 2024-07-30 PROCEDURE — 97140 MANUAL THERAPY 1/> REGIONS: CPT

## 2024-07-30 PROCEDURE — 97112 NEUROMUSCULAR REEDUCATION: CPT

## 2024-07-30 PROCEDURE — 97110 THERAPEUTIC EXERCISES: CPT

## 2024-07-30 NOTE — PROGRESS NOTES
"Daily Note     Today's date: 2024  Patient name: Saman Lara  : 1970  MRN: [id number]  Referring provider: Shimon Murphy MD  Dx:   Encounter Diagnosis     ICD-10-CM    1. Osteoarthritis of cervical spine, unspecified spinal osteoarthritis complication status  M47.812       2. Cervical pain (neck)  M54.2           Start Time: 0815  Stop Time: 0900  Total time in clinic (min): 45 minutes    Subjective: Pt reports having more cervical rotation ROM overall however still feels muscular tightness at end ranges given he is forcing the ROM.       Objective: See treatment diary below      Assessment: Tolerated treatment well. Patient demonstrated fatigue post treatment and would benefit from continued PT. Pt with improvement today with cervical ROM overall with less pain, yet slight pulling in suboccipitals and cervical paraspinals during R rotation noted. Improving with thoracic extension motor control with less cervical protraction noted as compensation during shoulder elevation. Planning re-eval next week to establish further POC.       Plan: Continue per plan of care.  Progress treatment as tolerated.       Precautions:   Patient Active Problem List   Diagnosis    Anxiety    Asthma    Hyperlipidemia    Hypertension    Controlled type 2 diabetes mellitus without complication, without long-term current use of insulin (HCC)    Obstructive sleep apnea syndrome           Manuals         Thoracic   RC gr 3-4 RC gr 3-4 ED gr 3-4 Rc gr 3-4        UT STM   IASTM + cervical paraspinals RC IASTM + cervical PSM ED IASTM + cervical paraspinals RC        Thoracic/cervical paraspinal STM   RC RC nv         Med/inf glides C3-C7    ED         Neuro Re-Ed             Chin tuck     Pink 2x10        Cervical ball rotation  10x bilat 10x bilat  10x ea B 10x bilat        DNF  5\" x10 5\" x15 nv         Tband rows/ext  Blk/red 2x10 ea Blk/red 2x10 ea  Blk/red 2x10 ea Blk/red 2x15 ea      " "  Tband habd             LT sling   GTB 20x bilat  nv GTB 20x bilat         Open book  10x bilat  10x bilat  nv         1/2 kneeling thoracic windmill @ wall     10x bilat         Quadruped shoulder flx c chin tuck  10x bilat 10x bilat  10x ea B 10x bilat        Cervical rot isos  5\" x10 bilat   nv                      Ther Ex             SNAG rotation 10x bilat 10x bilat  10x bilat nv 10x bilat        SNAG extension 10x 10x  10x  nv 10x bilat         Thoracic butterfly 10x 15x  15x  nv 15x         UT stretch HEP            No monies BTB 10x  BTB 20x BTB 20x  nv C 1/2 roll BTB 20x         UBE  2 min ea 2 min ea 2 min ea 2 min ea        1/2 roll hugs    10x ea 10x ea         1/2 roll alt flexion    10x ea 2x 10 ea         Tband abd diagonals  C 1/2 roll    GTB 10x bilat C 1/2 roll    GTB 10x bilat  W/ 1/2 roll GTB 10x ea B C 1/2 roll    GTB 10x bilat        Ther Activity                                       Gait Training                                       Modalities                                                "

## 2024-08-02 ENCOUNTER — OFFICE VISIT (OUTPATIENT)
Dept: PHYSICAL THERAPY | Facility: CLINIC | Age: 54
End: 2024-08-02
Payer: COMMERCIAL

## 2024-08-02 DIAGNOSIS — M47.812 OSTEOARTHRITIS OF CERVICAL SPINE, UNSPECIFIED SPINAL OSTEOARTHRITIS COMPLICATION STATUS: Primary | ICD-10-CM

## 2024-08-02 DIAGNOSIS — M54.2 CERVICAL PAIN (NECK): ICD-10-CM

## 2024-08-02 LAB
ALBUMIN SERPL-MCNC: 4.5 G/DL (ref 3.6–5.1)
ALBUMIN/GLOB SERPL: 1.5 (CALC) (ref 1–2.5)
ALP SERPL-CCNC: 53 U/L (ref 35–144)
ALT SERPL-CCNC: 37 U/L (ref 9–46)
AST SERPL-CCNC: 19 U/L (ref 10–35)
BASOPHILS # BLD AUTO: 82 CELLS/UL (ref 0–200)
BASOPHILS NFR BLD AUTO: 1.2 %
BILIRUB SERPL-MCNC: 0.7 MG/DL (ref 0.2–1.2)
BUN SERPL-MCNC: 15 MG/DL (ref 7–25)
BUN/CREAT SERPL: ABNORMAL (CALC) (ref 6–22)
CALCIUM SERPL-MCNC: 9.5 MG/DL (ref 8.6–10.3)
CHLORIDE SERPL-SCNC: 101 MMOL/L (ref 98–110)
CHOLEST SERPL-MCNC: 153 MG/DL
CHOLEST/HDLC SERPL: 4 (CALC)
CO2 SERPL-SCNC: 29 MMOL/L (ref 20–32)
CREAT SERPL-MCNC: 0.96 MG/DL (ref 0.7–1.3)
EOSINOPHIL # BLD AUTO: 762 CELLS/UL (ref 15–500)
EOSINOPHIL NFR BLD AUTO: 11.2 %
ERYTHROCYTE [DISTWIDTH] IN BLOOD BY AUTOMATED COUNT: 12.9 % (ref 11–15)
GFR/BSA.PRED SERPLBLD CYS-BASED-ARV: 94 ML/MIN/1.73M2
GLOBULIN SER CALC-MCNC: 3 G/DL (CALC) (ref 1.9–3.7)
GLUCOSE SERPL-MCNC: 102 MG/DL (ref 65–99)
HBA1C MFR BLD: 6.3 % OF TOTAL HGB
HCT VFR BLD AUTO: 46.6 % (ref 38.5–50)
HDLC SERPL-MCNC: 38 MG/DL
HGB BLD-MCNC: 15.2 G/DL (ref 13.2–17.1)
LDLC SERPL CALC-MCNC: 90 MG/DL (CALC)
LYMPHOCYTES # BLD AUTO: 1945 CELLS/UL (ref 850–3900)
LYMPHOCYTES NFR BLD AUTO: 28.6 %
MCH RBC QN AUTO: 29.5 PG (ref 27–33)
MCHC RBC AUTO-ENTMCNC: 32.6 G/DL (ref 32–36)
MCV RBC AUTO: 90.3 FL (ref 80–100)
MONOCYTES # BLD AUTO: 530 CELLS/UL (ref 200–950)
MONOCYTES NFR BLD AUTO: 7.8 %
NEUTROPHILS # BLD AUTO: 3482 CELLS/UL (ref 1500–7800)
NEUTROPHILS NFR BLD AUTO: 51.2 %
NONHDLC SERPL-MCNC: 115 MG/DL (CALC)
PLATELET # BLD AUTO: 151 THOUSAND/UL (ref 140–400)
PMV BLD REES-ECKER: 10.8 FL (ref 7.5–12.5)
POTASSIUM SERPL-SCNC: 4.2 MMOL/L (ref 3.5–5.3)
PROT SERPL-MCNC: 7.5 G/DL (ref 6.1–8.1)
PSA SERPL-MCNC: 0.57 NG/ML
RBC # BLD AUTO: 5.16 MILLION/UL (ref 4.2–5.8)
SODIUM SERPL-SCNC: 137 MMOL/L (ref 135–146)
TRIGL SERPL-MCNC: 149 MG/DL
TSH SERPL-ACNC: 0.82 MIU/L (ref 0.4–4.5)
WBC # BLD AUTO: 6.8 THOUSAND/UL (ref 3.8–10.8)

## 2024-08-02 PROCEDURE — 97112 NEUROMUSCULAR REEDUCATION: CPT

## 2024-08-02 PROCEDURE — 97110 THERAPEUTIC EXERCISES: CPT

## 2024-08-02 PROCEDURE — 97140 MANUAL THERAPY 1/> REGIONS: CPT

## 2024-08-02 NOTE — PROGRESS NOTES
"Daily Note     Today's date: 2024  Patient name: Saman Lara  : 1970  MRN: [id number]  Referring provider: Shimon Murphy MD  Dx:   Encounter Diagnosis     ICD-10-CM    1. Osteoarthritis of cervical spine, unspecified spinal osteoarthritis complication status  M47.812       2. Cervical pain (neck)  M54.2                      Subjective: Patient reported that he does feel his ROM is improving but still getting symptoms pushing into end range R rotation on L side of neck. Symptoms are also dependent on activities during the day.      Objective: See treatment diary below.      Assessment: L UT/LS and paraspinal tightness and restrictions more prominent on vs R. Continued focus of TE on improving DNF/postural strengthening and cervicothoracic mobility to help decrease compensations at neck secondary to weakness and immobility.      Plan: Continue per plan of care.  Progress treatment as tolerated.           Precautions:   Patient Active Problem List   Diagnosis    Anxiety    Asthma    Hyperlipidemia    Hypertension    Controlled type 2 diabetes mellitus without complication, without long-term current use of insulin (HCC)    Obstructive sleep apnea syndrome       Manuals        Thoracic   RC gr 3-4 RC gr 3-4 ED gr 3-4 Rc gr 3-4        UT STM   IASTM + cervical paraspinals RC IASTM + cervical PSM ED IASTM + cervical paraspinals RC And cerv paraspinals IASTM - EH       Thoracic/cervical paraspinal STM   RC RC nv         Med/inf glides C3-C7    ED                      Neuro Re-Ed             Chin tuck     Pink 2x10 Pink  2x10       Cervical ball rotation  10x bilat 10x bilat  10x ea B 10x bilat x10  bilat       DNF  5\" x10 5\" x15 nv         Tband rows/ext  Blk/red 2x10 ea Blk/red 2x10 ea  Blk/red 2x10 ea Blk/red 2x15 ea Black and red    2x15  ea       Tband habd             LT sling   GTB 20x bilat  nv GTB 20x bilat         Open book  10x bilat  10x bilat  nv         1/2 " "kneeling thoracic windmill @ wall     10x bilat  x10  bilat       Quadruped shoulder flx c chin tuck  10x bilat 10x bilat  10x ea B 10x bilat x10  bilat       Cervical rot isos  5\" x10 bilat   nv                      Ther Ex             SNAG rotation 10x bilat 10x bilat  10x bilat nv 10x bilat x10  bilat       SNAG extension 10x 10x  10x  nv 10x bilat  x10       Thoracic butterfly 10x 15x  15x  nv 15x  x15       UT stretch HEP            No monies BTB 10x  BTB 20x BTB 20x  nv C 1/2 roll BTB 20x  With half foam at wall    BTB  x20       UBE  2 min ea 2 min ea 2 min ea 2 min ea 2 min ea       1/2 roll hugs    10x ea 10x ea  2x10       1/2 roll alt flexion    10x ea 2x 10 ea  2x10       Tband abd diagonals  C 1/2 roll    GTB 10x bilat C 1/2 roll    GTB 10x bilat  W/ 1/2 roll GTB 10x ea B C 1/2 roll    GTB 10x bilat With half foam at wall    GTB  x10 bilat                    Ther Activity                                       Gait Training                                       Modalities                                            "

## 2024-08-06 ENCOUNTER — OFFICE VISIT (OUTPATIENT)
Dept: FAMILY MEDICINE CLINIC | Facility: CLINIC | Age: 54
End: 2024-08-06
Payer: COMMERCIAL

## 2024-08-06 VITALS
HEIGHT: 70 IN | TEMPERATURE: 97.7 F | BODY MASS INDEX: 33.36 KG/M2 | OXYGEN SATURATION: 97 % | HEART RATE: 67 BPM | DIASTOLIC BLOOD PRESSURE: 84 MMHG | WEIGHT: 233 LBS | SYSTOLIC BLOOD PRESSURE: 128 MMHG

## 2024-08-06 DIAGNOSIS — E11.9 CONTROLLED TYPE 2 DIABETES MELLITUS WITHOUT COMPLICATION, WITHOUT LONG-TERM CURRENT USE OF INSULIN (HCC): ICD-10-CM

## 2024-08-06 DIAGNOSIS — J45.909 ASTHMA, UNSPECIFIED ASTHMA SEVERITY, UNSPECIFIED WHETHER COMPLICATED, UNSPECIFIED WHETHER PERSISTENT: ICD-10-CM

## 2024-08-06 DIAGNOSIS — Z00.00 ANNUAL PHYSICAL EXAM: Primary | ICD-10-CM

## 2024-08-06 PROCEDURE — 99396 PREV VISIT EST AGE 40-64: CPT | Performed by: FAMILY MEDICINE

## 2024-08-06 RX ORDER — FLUTICASONE PROPIONATE AND SALMETEROL 250; 50 UG/1; UG/1
1 POWDER RESPIRATORY (INHALATION) 2 TIMES DAILY
Qty: 180 BLISTER | Refills: 1 | Status: SHIPPED | OUTPATIENT
Start: 2024-08-06 | End: 2024-11-04

## 2024-08-06 NOTE — ASSESSMENT & PLAN NOTE
Patient was seen for annual physical exam.  Overall he is doing fairly well.  We reviewed his recent blood work.  He is up-to-date on immunizations and colon screening.  We reviewed his medications and his chronic medical problems.

## 2024-08-06 NOTE — PROGRESS NOTES
Adult Annual Physical  Name: Saman Lara      : 1970      MRN: [id number]  Encounter Provider: Herb Camarena DO  Encounter Date: 2024   Encounter department: [facility]    Assessment & Plan   1. Annual physical exam  Assessment & Plan:  Patient was seen for annual physical exam.  Overall he is doing fairly well.  We reviewed his recent blood work.  He is up-to-date on immunizations and colon screening.  We reviewed his medications and his chronic medical problems.  2. Controlled type 2 diabetes mellitus without complication, without long-term current use of insulin ([facility])  Assessment & Plan:    Lab Results   Component Value Date    HGBA1C 6.3 (H) 2024   Diabetic control overall is good on combination of Ozempic and metformin.  Will increase his Ozempic dose from 1 mg weekly to 2 mg weekly to aid in weight loss.  Orders:  -     semaglutide, 2 mg/dose, (Ozempic, 2 MG/DOSE,) 8 mg/ mL injection pen; Inject 0.75 mL (2 mg total) under the skin every 7 days  3. Asthma, unspecified asthma severity, unspecified whether complicated, unspecified whether persistent  -     Fluticasone-Salmeterol (Advair Diskus) 250-50 mcg/dose inhaler; Inhale 1 puff 2 (two) times a day Rinse mouth after use.    Immunizations and preventive care screenings were discussed with patient today. Appropriate education was printed on patient's after visit summary.    Discussed risks and benefits of prostate cancer screening. We discussed the controversial history of PSA screening for prostate cancer in the [place] as well as the risk of over detection and over treatment of prostate cancer by way of PSA screening.  The patient understands that PSA blood testing is an imperfect way to screen for prostate cancer and that elevated PSA levels in the blood may also be caused by infection, inflammation, prostatic trauma or manipulation, urological procedures, or by benign prostatic  enlargement.    The role of the digital rectal examination in prostate cancer screening was also discussed and I discussed with him that there is large interobserver variability in the findings of digital rectal examination.    Counseling:  Alcohol/drug use: discussed moderation in alcohol intake, the recommendations for healthy alcohol use, and avoidance of illicit drug use.  Dental Health: discussed importance of regular tooth brushing, flossing, and dental visits.  Injury prevention: discussed safety/seat belts, safety helmets, smoke detectors, carbon dioxide detectors, and smoking near bedding or upholstery.  Exercise: the importance of regular exercise/physical activity was discussed. Recommend exercise 3-5 times per week for at least 30 minutes.          History of Present Illness     Adult Annual Physical:  Patient presents for annual physical. Patient was seen for annual physical exam.  Only complaint at this time is some left-sided neck pain for which she is seeing orthopedics and going to PT for.  Otherwise he feels relatively well.  He is some what frustrated with his lack of weight loss.  He is on Ozempic for his diabetes along with metformin..     Diet and Physical Activity:  - Diet/Nutrition: well balanced diet and consuming 3-5 servings of fruits/vegetables daily.  - Exercise: 3-4 times a week on average and moderate cardiovascular exercise.    Depression Screening:  - PHQ-2 Score: 0    General Health:  - Sleep: 7-8 hours of sleep on average.  - Hearing: normal hearing bilateral ears.  - Vision: wears glasses and goes for regular eye exams.  - Dental: regular dental visits.     Health:    - Urinary symptoms: none.     Review of Systems   Constitutional: Negative.    Respiratory: Negative.     Cardiovascular: Negative.    Gastrointestinal: Negative.    Genitourinary: Negative.    Musculoskeletal: Negative.    Psychiatric/Behavioral: Negative.           Objective     /84 (BP Location: Left arm,  "Patient Position: Sitting, Cuff Size: Large)   Pulse 67   Temp 97.7 °F (36.5 °C) (Temporal)   Ht 5' 9.69\" (1.77 m)   Wt 106 kg (233 lb)   SpO2 97%   BMI 33.73 kg/m²     Physical Exam  Vitals and nursing note reviewed.   Constitutional:       Appearance: Normal appearance. He is well-developed.   HENT:      Head: Normocephalic.      Right Ear: External ear normal.      Left Ear: External ear normal.      Nose: Nose normal.      Mouth/Throat:      Mouth: Mucous membranes are moist.   Eyes:      Conjunctiva/sclera: Conjunctivae normal.      Pupils: Pupils are equal, round, and reactive to light.   Cardiovascular:      Rate and Rhythm: Normal rate and regular rhythm.      Heart sounds: Normal heart sounds.   Pulmonary:      Effort: Pulmonary effort is normal.      Breath sounds: Normal breath sounds.   Abdominal:      General: Bowel sounds are normal.      Palpations: Abdomen is soft.   Musculoskeletal:         General: Normal range of motion.      Cervical back: Normal range of motion and neck supple.   Skin:     General: Skin is warm and dry.   Neurological:      Mental Status: He is alert.   Psychiatric:         Mood and Affect: Mood normal.         Behavior: Behavior normal.         Thought Content: Thought content normal.         Judgment: Judgment normal.         "

## 2024-08-06 NOTE — ASSESSMENT & PLAN NOTE
Lab Results   Component Value Date    HGBA1C 6.3 (H) 08/01/2024   Diabetic control overall is good on combination of Ozempic and metformin.  Will increase his Ozempic dose from 1 mg weekly to 2 mg weekly to aid in weight loss.

## 2024-08-08 ENCOUNTER — OFFICE VISIT (OUTPATIENT)
Dept: PHYSICAL THERAPY | Facility: CLINIC | Age: 54
End: 2024-08-08
Payer: COMMERCIAL

## 2024-08-08 DIAGNOSIS — M47.812 OSTEOARTHRITIS OF CERVICAL SPINE, UNSPECIFIED SPINAL OSTEOARTHRITIS COMPLICATION STATUS: Primary | ICD-10-CM

## 2024-08-08 DIAGNOSIS — M54.2 CERVICAL PAIN (NECK): ICD-10-CM

## 2024-08-08 PROCEDURE — 97140 MANUAL THERAPY 1/> REGIONS: CPT

## 2024-08-08 PROCEDURE — 97110 THERAPEUTIC EXERCISES: CPT

## 2024-08-08 PROCEDURE — 97112 NEUROMUSCULAR REEDUCATION: CPT

## 2024-08-08 NOTE — PROGRESS NOTES
"Daily Note     Today's date: 2024  Patient name: Saman Lara  : 1970  MRN: [id number]  Referring provider: Shimon Murphy MD  Dx:   Encounter Diagnosis     ICD-10-CM    1. Osteoarthritis of cervical spine, unspecified spinal osteoarthritis complication status  M47.812       2. Cervical pain (neck)  M54.2           Start Time: 0815  Stop Time: 0900  Total time in clinic (min): 45 minutes    Subjective: Pt reports some increased soreness and pain at upper neck a few days ago.      Objective: See treatment diary below      Assessment: Tolerated treatment well. Patient demonstrated fatigue post treatment and would benefit from continued PT. Biased upper cervical joints with SNAGs today due to increased pain on L cervical. Added OA mobs biasing L side as well today to further initiate mobility throughout upper cervical area. Improvement in postural control with all standing activities as well as improvement with thoracic rotation with windmills at wall.       Plan: Continue per plan of care.  Progress treatment as tolerated.       Precautions:   Patient Active Problem List   Diagnosis    Anxiety    Asthma    Hyperlipidemia    Hypertension    Controlled type 2 diabetes mellitus without complication, without long-term current use of insulin (HCC)    Obstructive sleep apnea syndrome       Manuals       Thoracic   RC gr 3-4 RC gr 3-4 ED gr 3-4 Rc gr 3-4        UT STM   IASTM + cervical paraspinals RC IASTM + cervical PSM ED IASTM + cervical paraspinals RC And cerv paraspinals IASTM - EH And cervical paraspinals IASTM - RC      Thoracic/cervical paraspinal STM   RC RC nv         SOR/ OA mobs       RC gr 3      Med/inf glides C3-C7    ED   RC gr 3                   Neuro Re-Ed             Chin tuck     Pink 2x10 Pink  2x10 Pink 2x10      Cervical ball rotation  10x bilat 10x bilat  10x ea B 10x bilat x10  bilat       DNF  5\" x10 5\" x15 nv   5\" x15      Tband rows/ext  " "Blk/red 2x10 ea Blk/red 2x10 ea  Blk/red 2x10 ea Blk/red 2x15 ea Black and red    2x15  ea Black and red    2x15 ea      Tband habd             LT sling   GTB 20x bilat  nv GTB 20x bilat         Open book  10x bilat  10x bilat  nv         1/2 kneeling thoracic windmill @ wall     10x bilat  x10  bilat 15x bilat      Quadruped shoulder flx c chin tuck  10x bilat 10x bilat  10x ea B 10x bilat x10  bilat       Cervical rot isos  5\" x10 bilat   nv                      Ther Ex             SNAG rotation 10x bilat 10x bilat  10x bilat nv 10x bilat x10  bilat 10x bilat upper cervical bias      SNAG extension 10x 10x  10x  nv 10x bilat  x10       Thoracic butterfly 10x 15x  15x  nv 15x  x15       UT stretch HEP            No monies BTB 10x  BTB 20x BTB 20x  nv C 1/2 roll BTB 20x  With half foam at wall    BTB  x20 1/2 roll at wall    BTB 20x      UBE  2 min ea 2 min ea 2 min ea 2 min ea 2 min ea 2 min ea      1/2 roll hugs    10x ea 10x ea  2x10       1/2 roll alt flexion    10x ea 2x 10 ea  2x10       Tband abd diagonals  C 1/2 roll    GTB 10x bilat C 1/2 roll    GTB 10x bilat  W/ 1/2 roll GTB 10x ea B C 1/2 roll    GTB 10x bilat With half foam at wall    GTB  x10 bilat 1/2 foam at wall    BTB 10x bilat                   Ther Activity                                       Gait Training                                       Modalities                                              "

## 2024-08-13 ENCOUNTER — EVALUATION (OUTPATIENT)
Dept: PHYSICAL THERAPY | Facility: CLINIC | Age: 54
End: 2024-08-13
Payer: COMMERCIAL

## 2024-08-13 DIAGNOSIS — M54.2 CERVICAL PAIN (NECK): ICD-10-CM

## 2024-08-13 DIAGNOSIS — M47.812 OSTEOARTHRITIS OF CERVICAL SPINE, UNSPECIFIED SPINAL OSTEOARTHRITIS COMPLICATION STATUS: Primary | ICD-10-CM

## 2024-08-13 PROCEDURE — 97140 MANUAL THERAPY 1/> REGIONS: CPT

## 2024-08-13 PROCEDURE — 97110 THERAPEUTIC EXERCISES: CPT

## 2024-08-13 PROCEDURE — 97112 NEUROMUSCULAR REEDUCATION: CPT

## 2024-08-13 NOTE — PROGRESS NOTES
PT Re-Evaluation     Today's date: 2024  Patient name: Saman Lara  : 1970  MRN: [id number]  Referring provider: Shimon Murphy MD  Dx:   Encounter Diagnosis     ICD-10-CM    1. Osteoarthritis of cervical spine, unspecified spinal osteoarthritis complication status  M47.812 PT plan of care cert/re-cert      2. Cervical pain (neck)  M54.2 PT plan of care cert/re-cert            Start Time: 0815  Stop Time: 0900  Total time in clinic (min): 45 minutes    Assessment  Impairments: abnormal coordination, abnormal muscle firing, abnormal muscle tone, abnormal or restricted ROM, abnormal movement, activity intolerance, impaired physical strength, lacks appropriate home exercise program, pain with function and poor posture   Functional limitations: decreased AROM tolerance, decreased ability to maintain prolonged postures, difficulty with lifting OH  Symptom irritability: moderate    Assessment details: Re-eval : Pt is a 52 yo male who has attended 8 OPPT visits for cervical land upper thoracic pain.     IE : Saman Lara is a 53 y.o. male presenting to physical therapy today for s/s consistent with upper thoracic and lower cervical pain. Pt demonstrates significant improvement in cervical AROM overall with less pain throughout movements, only with cervical L rotation and sidebending indicating mild closing restriction yet at C5-6 due to pain also with downglides at C5. Pt with no provocative testing indicated and improved DNF endurance assisting for anterior neck stabilization overall. Pt will continue to benefit from skilled PT to further address closing restriction, decrease pain and return to PLOF.     Current objective impairments:increased tone throughout cervical/thoracic paraspinals, decreased cervical AROM into rotation and lateral flexion with increased pain, limited thoracic joint mobility in upper thoracic spine with reproduction of pain with  through T2-6. Pt with  decreased DNF endurance with fatigue in 6s and postural impairments of forward head and rounded shoulders. Provocative testing of distraction relieving pain indicating compressive lesion in thoracic spine.     Response to today's tx: good, educated and completed HEP     Pt will benefit from skilled physical therapy to address above listed impairments and return to PLOF.   Understanding of Dx/Px/POC: good     Prognosis: good    Goals  STG:  Pt will decrease pain to <2/10 with all ADLs in 3 weeks to improve QoL PROGRESSING  2. Pt will increase DNF endurance to 10s maintained contraction in 3 weeks to improve DNF strength and cervical stabilization. MET    LTG:  Pt will report pain <2/10 with all bending/lifting activities in 6 weeks to return to PLOF PROGRESSING  2. Pt will improve cervical AROM with no reports of pain in  weeks to improve movement tolerance. PROGRESSING  3. Pt will demonstrate normal cervical joint mobility in 6 weeks.  PROGRESSING  4. Pt will improve functional status measure from 52 to >68 in order to better complete ADLs.  MET    Plan  Patient would benefit from: skilled physical therapy  Planned modality interventions: low level laser therapy, TENS, thermotherapy: hydrocollator packs and cryotherapy    Planned therapy interventions: IASTM, joint mobilization, kinesiology taping, manual therapy, nerve gliding, neuromuscular re-education, patient education, postural training, self care, strengthening, stretching, therapeutic activities, therapeutic exercise, home exercise program, functional ROM exercises, flexibility, balance/weight bearing training and balance    Frequency: 2x week  Duration in weeks: 4  Plan of Care beginning date: 8/13/2024  Plan of Care expiration date: 9/10/2024  Treatment plan discussed with: patient  Plan details: Pt will benefit from skilled physical therapy to treat aforementioned impairments, decrease pain and return to PLOF.           Subjective Evaluation    History of  Present Illness  Date of onset: 2024  Mechanism of injury: Re-eval : pt reports improvement since SOC significantly with ROM of neck, however still feels like he has good days and bad days with pain. Thinks it might be due to overdoing it when pain is low. Pt reports 85% improvement since SOC. Pt reports 2 different pains yet, has tightness with cervical rotation other is sharp pain is around once a week. Sharp pain is at base of skull to middle pf back.    IE : Pt reports BONNIE: woke up to rotate to turn alarm off and caused massively sharp pain. Happened while turning to the right. After, looking left to right got progressively worse.     Previous tx to date: ortho dr took x-rays showing OA and prescribed prednisone/tylenol - can move a little better but has limited motion    24 hour pattern: activity dependent     Red Flags: clear          Not a recurrent problem   Quality of life: good    Patient Goals  Patient goals for therapy: increased motion, increased strength, decreased pain and return to sport/leisure activities    Pain  Current pain ratin  At best pain ratin  At worst pain ratin  Location: center of lower cervical spine/upper thoracic  Quality: sharp, tight and dull ache  Relieving factors: medications and heat (sitting upright, slower movements)  Aggravating factors: overhead activity and lifting (turning head, driving, prolonged positions, quicker movements)  Progression: improved    Social Support    Employment status: working (full-time, pathology)    Diagnostic Tests  X-ray: abnormal (OA, DDD)  Treatments  Current treatment: physical therapy        Objective     Static Posture     Head  Forward.    Shoulders  Rounded.    Tenderness     Additional Tenderness Details  No TTP    Hypertonicity of bilat cervical paraspinals, UT, LS     Active Range of Motion   Cervical/Thoracic Spine       Cervical  Subcranial protraction:  WFL   Subcranial retraction:  WFL   Flexion: 58 degrees  "  Extension: 54 degrees      Left lateral flexion: 30 degrees      Right lateral flexion: 22 degrees     Restriction level minimal  Left rotation: 65 degrees with pain Restriction level: moderate  Right rotation: 62 degrees    with pain Restriction level: moderate    Thoracic    Flexion:  WFL  Extension:  Restriction level: minimal  Left rotation:  WFL  Right rotation:  WFL    Joint Play   Joints within functional limits: C1, C2, C3 and C4     Hypomobile: C5, C6, C7, T1, T2, T3, T4, T5, T6 and T7     Pain: C5     Strength/Myotome Testing     Additional Strength Details  DNF endurance 14s to fatigue    Tests   Cervical   Positive neck flexor muscle endurance test.  Negative vertical compression and cervical distraction.     Lumbar   Negative vertical compression.              Precautions:   Patient Active Problem List   Diagnosis    Anxiety    Asthma    Hyperlipidemia    Hypertension    Controlled type 2 diabetes mellitus without complication, without long-term current use of insulin ([facility]CC)    Obstructive sleep apnea syndrome    Annual physical exam           Manuals 7/16 7/17 7/22 7/26 7/30 8/2 8/8 8/13     Thoracic   RC gr 3-4 RC gr 3-4 ED gr 3-4 Rc gr 3-4   RC gr 4     UT STM   IASTM + cervical paraspinals RC IASTM + cervical PSM ED IASTM + cervical paraspinals RC And cerv paraspinals IASTM - EH And cervical paraspinals IASTM - RC      Thoracic/cervical paraspinal STM   RC RC nv         SOR/ OA mobs       RC gr 3 RC gr 3     Med/inf glides C3-C7    ED   RC gr 3 RC gr 3                  Neuro Re-Ed             Re-eval        RC     Chin tuck     Pink 2x10 Pink  2x10 Pink 2x10 Pink 2x10     Cervical ball rotation  10x bilat 10x bilat  10x ea B 10x bilat x10  bilat  10x bilat      DNF  5\" x10 5\" x15 nv   5\" x15      Tband rows/ext  Blk/red 2x10 ea Blk/red 2x10 ea  Blk/red 2x10 ea Blk/red 2x15 ea Black and red    2x15  ea Black and red    2x15 ea      Tband habd             LT sling   GTB 20x bilat  nv GTB 20x " "bilat         Open book  10x bilat  10x bilat  nv         1/2 kneeling thoracic windmill @ wall     10x bilat  x10  bilat 15x bilat      Quadruped shoulder flx c chin tuck  10x bilat 10x bilat  10x ea B 10x bilat x10  bilat       Cervical rot isos  5\" x10 bilat   nv    5\" x10 bilat                  Ther Ex             SNAG rotation 10x bilat 10x bilat  10x bilat nv 10x bilat x10  bilat 10x bilat upper cervical bias 10x bilat upper cervical bias     SNAG extension 10x 10x  10x  nv 10x bilat  x10       LS seated stretch        10\" x5 bilat     Cervical flexion with OP        5\" x10 bilat     Thoracic butterfly 10x 15x  15x  nv 15x  x15       UT stretch HEP            No monies BTB 10x  BTB 20x BTB 20x  nv C 1/2 roll BTB 20x  With half foam at wall    BTB  x20 1/2 roll at wall    BTB 20x      UBE  2 min ea 2 min ea 2 min ea 2 min ea 2 min ea 2 min ea 2 min ea     1/2 roll hugs    10x ea 10x ea  2x10       1/2 roll alt flexion    10x ea 2x 10 ea  2x10       Tband abd diagonals  C 1/2 roll    GTB 10x bilat C 1/2 roll    GTB 10x bilat  W/ 1/2 roll GTB 10x ea B C 1/2 roll    GTB 10x bilat With half foam at wall    GTB  x10 bilat 1/2 foam at wall    BTB 10x bilat                   Ther Activity                                       Gait Training                                       Modalities                                           "

## 2024-08-20 ENCOUNTER — OFFICE VISIT (OUTPATIENT)
Dept: PHYSICAL THERAPY | Facility: CLINIC | Age: 54
End: 2024-08-20
Payer: COMMERCIAL

## 2024-08-20 DIAGNOSIS — M47.812 OSTEOARTHRITIS OF CERVICAL SPINE, UNSPECIFIED SPINAL OSTEOARTHRITIS COMPLICATION STATUS: Primary | ICD-10-CM

## 2024-08-20 DIAGNOSIS — M54.2 CERVICAL PAIN (NECK): ICD-10-CM

## 2024-08-20 PROCEDURE — 97140 MANUAL THERAPY 1/> REGIONS: CPT

## 2024-08-20 PROCEDURE — 97110 THERAPEUTIC EXERCISES: CPT

## 2024-08-20 PROCEDURE — 97112 NEUROMUSCULAR REEDUCATION: CPT

## 2024-08-20 NOTE — PROGRESS NOTES
"Daily Note     Today's date: 2024  Patient name: Saman Lara  : 1970  MRN: [id number]  Referring provider: Shimon Murphy MD  Dx:   Encounter Diagnosis     ICD-10-CM    1. Osteoarthritis of cervical spine, unspecified spinal osteoarthritis complication status  M47.812       2. Cervical pain (neck)  M54.2                      Subjective: Patient reported having significant soreness in posterior neck following progressions last session, so much that he took a break from his HEP over the weekend. Still feeling some limitations in cervical rotation.      Objective: See treatment diary below.      Assessment: L UT/LS and paraspinal tightness and restrictions still present on L. Continued focus of TE on improving DNF/postural strengthening and cervicothoracic mobility to help decrease compensations at neck secondary to weakness and immobility.       Plan: Continue per plan of care.  Progress treatment as tolerated.           Precautions:   Patient Active Problem List   Diagnosis    Anxiety    Asthma    Hyperlipidemia    Hypertension    Controlled type 2 diabetes mellitus without complication, without long-term current use of insulin (HCC)    Obstructive sleep apnea syndrome    Annual physical exam       Manuals     Thoracic   RC gr 3-4 RC gr 3-4 ED gr 3-4 Rc gr 3-4   RC gr 4     UT STM   IASTM + cervical paraspinals RC IASTM + cervical PSM ED IASTM + cervical paraspinals RC And cerv paraspinals IASTM - EH And cervical paraspinals IASTM - RC  And cerical paraspinals IASTM - EH    Thoracic/cervical paraspinal STM   RC RC nv         SOR/ OA mobs       RC gr 3 RC gr 3 Manual SOR - EH    Med/inf glides C3-C7    ED   RC gr 3 RC gr 3                  Neuro Re-Ed             Re-eval        RC     Chin tuck     Pink 2x10 Pink  2x10 Pink 2x10 Pink 2x10     Cervical ball rotation  10x bilat 10x bilat  10x ea B 10x bilat x10  bilat  10x bilat  x10  bilat    DNF  5\" " "x10 5\" x15 nv   5\" x15      Tband rows/ext  Blk/red 2x10 ea Blk/red 2x10 ea  Blk/red 2x10 ea Blk/red 2x15 ea Black and red    2x15  ea Black and red    2x15 ea      Tband habd             LT sling   GTB 20x bilat  nv GTB 20x bilat         Open book  10x bilat  10x bilat  nv         1/2 kneeling thoracic windmill @ wall     10x bilat  x10  bilat 15x bilat      Quadruped shoulder flx c chin tuck  10x bilat 10x bilat  10x ea B 10x bilat x10  bilat       Cervical rot isos  5\" x10 bilat   nv    5\" x10 bilat 5\"x10  bilat                 Ther Ex             SNAG rotation 10x bilat 10x bilat  10x bilat nv 10x bilat x10  bilat 10x bilat upper cervical bias 10x bilat upper cervical bias Upper cerv bias    x10    SNAG extension 10x 10x  10x  nv 10x bilat  x10       LS seated stretch        10\" x5 bilat 10\"x5  bilat    Cervical flexion with OP        5\" x10 bilat     Thoracic butterfly 10x 15x  15x  nv 15x  x15       UT stretch HEP            No monies BTB 10x  BTB 20x BTB 20x  nv C 1/2 roll BTB 20x  With half foam at wall    BTB  x20 1/2 roll at wall    BTB 20x      UBE  2 min ea 2 min ea 2 min ea 2 min ea 2 min ea 2 min ea 2 min ea 2 min ea    1/2 roll hugs    10x ea 10x ea  2x10       1/2 roll alt flexion    10x ea 2x 10 ea  2x10       Tband abd diagonals  C 1/2 roll    GTB 10x bilat C 1/2 roll    GTB 10x bilat  W/ 1/2 roll GTB 10x ea B C 1/2 roll    GTB 10x bilat With half foam at wall    GTB  x10 bilat 1/2 foam at wall    BTB 10x bilat                   Ther Activity                                       Gait Training                                       Modalities                                            "

## 2024-08-22 ENCOUNTER — OFFICE VISIT (OUTPATIENT)
Dept: PHYSICAL THERAPY | Facility: CLINIC | Age: 54
End: 2024-08-22
Payer: COMMERCIAL

## 2024-08-22 DIAGNOSIS — M54.2 CERVICAL PAIN (NECK): ICD-10-CM

## 2024-08-22 DIAGNOSIS — M47.812 OSTEOARTHRITIS OF CERVICAL SPINE, UNSPECIFIED SPINAL OSTEOARTHRITIS COMPLICATION STATUS: Primary | ICD-10-CM

## 2024-08-22 PROCEDURE — 97140 MANUAL THERAPY 1/> REGIONS: CPT

## 2024-08-22 PROCEDURE — 97110 THERAPEUTIC EXERCISES: CPT

## 2024-08-22 PROCEDURE — 97112 NEUROMUSCULAR REEDUCATION: CPT

## 2024-08-22 NOTE — PROGRESS NOTES
Daily Note     Today's date: 2024  Patient name: Saman Lara  : 1970  MRN: [id number]  Referring provider: Shimon Murphy MD  Dx:   Encounter Diagnosis     ICD-10-CM    1. Osteoarthritis of cervical spine, unspecified spinal osteoarthritis complication status  M47.812       2. Cervical pain (neck)  M54.2           Start Time: 08  Stop Time: 08  Total time in clinic (min): 41 minutes    Subjective: Thought he might have has a set back with the new LS stretch, bu thinks he just over worked the muscles. They have since calmed down.       Objective: See treatment diary below      Assessment: Tolerated treatment well. Patient demonstrated fatigue post treatment. Pt with notable improved joint mobility with thoracic  T1-6 and with cervical downglides on L, demonstrating no further closing restriction C4-7. Pt demonstrates independence with HEP moving forward and has met all official goals in formal PT.       Plan:  Pt will be placed on hold for 30 days due to independence with HEP and meeting goals.      Precautions:   Patient Active Problem List   Diagnosis    Anxiety    Asthma    Hyperlipidemia    Hypertension    Controlled type 2 diabetes mellitus without complication, without long-term current use of insulin (HCC)    Obstructive sleep apnea syndrome    Annual physical exam       Manuals    Thoracic   RC gr 3-4 RC gr 3-4 ED gr 3-4 Rc gr 3-4   RC gr 4  RC gr 4   UT STM   IASTM + cervical paraspinals RC IASTM + cervical PSM ED IASTM + cervical paraspinals RC And cerv paraspinals IASTM - EH And cervical paraspinals IASTM - RC  And cerical paraspinals IASTM - EH And cervical paraspinals IASTM - RC   Thoracic/cervical paraspinal STM   RC RC nv         SOR/ OA mobs       RC gr 3 RC gr 3 Manual SOR - EH RC gr 3-4    Med/inf glides C3-C7    ED   RC gr 3 RC gr 3  RC gr 4                Neuro Re-Ed             Re-eval        RC     Chin tuck      "Pink 2x10 Pink  2x10 Pink 2x10 Pink 2x10     Cervical ball rotation  10x bilat 10x bilat  10x ea B 10x bilat x10  bilat  10x bilat  x10  bilat    DNF  5\" x10 5\" x15 nv   5\" x15   5\" x15   Tband rows/ext  Blk/red 2x10 ea Blk/red 2x10 ea  Blk/red 2x10 ea Blk/red 2x15 ea Black and red    2x15  ea Black and red    2x15 ea      Tband habd             LT sling   GTB 20x bilat  nv GTB 20x bilat         Open book  10x bilat  10x bilat  nv         1/2 kneeling thoracic windmill @ wall     10x bilat  x10  bilat 15x bilat   15x bilat   Quadruped shoulder flx c chin tuck  10x bilat 10x bilat  10x ea B 10x bilat x10  bilat       Cervical rot isos  5\" x10 bilat   nv    5\" x10 bilat 5\"x10  bilat 5\" x10 bilat                Ther Ex             SNAG rotation 10x bilat 10x bilat  10x bilat nv 10x bilat x10  bilat 10x bilat upper cervical bias 10x bilat upper cervical bias Upper cerv bias    x10 10x ea   SNAG extension 10x 10x  10x  nv 10x bilat  x10       LS seated stretch        10\" x5 bilat 10\"x5  bilat 10\" x5 bilat   Cervical flexion with OP        5\" x10 bilat     Thoracic butterfly 10x 15x  15x  nv 15x  x15       UT stretch HEP            No monies BTB 10x  BTB 20x BTB 20x  nv C 1/2 roll BTB 20x  With half foam at wall    BTB  x20 1/2 roll at wall    BTB 20x      UBE  2 min ea 2 min ea 2 min ea 2 min ea 2 min ea 2 min ea 2 min ea 2 min ea 2 min ea   1/2 roll hugs    10x ea 10x ea  2x10       1/2 roll alt flexion    10x ea 2x 10 ea  2x10       Tband abd diagonals  C 1/2 roll    GTB 10x bilat C 1/2 roll    GTB 10x bilat  W/ 1/2 roll GTB 10x ea B C 1/2 roll    GTB 10x bilat With half foam at wall    GTB  x10 bilat 1/2 foam at wall    BTB 10x bilat                   Ther Activity                                       Gait Training                                       Modalities                                              "

## 2024-09-02 DIAGNOSIS — J45.909 ASTHMA, UNSPECIFIED ASTHMA SEVERITY, UNSPECIFIED WHETHER COMPLICATED, UNSPECIFIED WHETHER PERSISTENT: ICD-10-CM

## 2024-09-02 RX ORDER — ALBUTEROL SULFATE 90 UG/1
AEROSOL, METERED RESPIRATORY (INHALATION)
Qty: 6.7 G | Refills: 1 | Status: SHIPPED | OUTPATIENT
Start: 2024-09-02

## 2024-10-28 DIAGNOSIS — J45.909 ASTHMA, UNSPECIFIED ASTHMA SEVERITY, UNSPECIFIED WHETHER COMPLICATED, UNSPECIFIED WHETHER PERSISTENT: ICD-10-CM

## 2024-10-28 RX ORDER — ALBUTEROL SULFATE 90 UG/1
INHALANT RESPIRATORY (INHALATION)
Qty: 6.7 G | Refills: 1 | Status: SHIPPED | OUTPATIENT
Start: 2024-10-28

## 2025-01-05 DIAGNOSIS — I10 ESSENTIAL HYPERTENSION: ICD-10-CM

## 2025-01-05 DIAGNOSIS — E13.9 DIABETES 1.5, MANAGED AS TYPE 2 (HCC): ICD-10-CM

## 2025-01-05 DIAGNOSIS — J45.909 ASTHMA, UNSPECIFIED ASTHMA SEVERITY, UNSPECIFIED WHETHER COMPLICATED, UNSPECIFIED WHETHER PERSISTENT: ICD-10-CM

## 2025-01-05 DIAGNOSIS — E78.5 HYPERLIPIDEMIA, UNSPECIFIED HYPERLIPIDEMIA TYPE: ICD-10-CM

## 2025-01-05 RX ORDER — ATORVASTATIN CALCIUM 20 MG/1
20 TABLET, FILM COATED ORAL DAILY
Qty: 90 TABLET | Refills: 1 | Status: SHIPPED | OUTPATIENT
Start: 2025-01-05

## 2025-01-05 RX ORDER — MONTELUKAST SODIUM 10 MG/1
10 TABLET ORAL DAILY
Qty: 90 TABLET | Refills: 1 | Status: SHIPPED | OUTPATIENT
Start: 2025-01-05

## 2025-01-05 RX ORDER — OLMESARTAN MEDOXOMIL AND HYDROCHLOROTHIAZIDE 40/12.5 40; 12.5 MG/1; MG/1
1 TABLET ORAL DAILY
Qty: 90 TABLET | Refills: 1 | Status: SHIPPED | OUTPATIENT
Start: 2025-01-05

## 2025-01-10 DIAGNOSIS — Z00.6 ENCOUNTER FOR EXAMINATION FOR NORMAL COMPARISON OR CONTROL IN CLINICAL RESEARCH PROGRAM: ICD-10-CM

## 2025-01-20 DIAGNOSIS — E11.9 CONTROLLED TYPE 2 DIABETES MELLITUS WITHOUT COMPLICATION, WITHOUT LONG-TERM CURRENT USE OF INSULIN (HCC): ICD-10-CM

## 2025-01-21 ENCOUNTER — APPOINTMENT (OUTPATIENT)
Dept: LAB | Facility: CLINIC | Age: 55
End: 2025-01-21

## 2025-01-21 DIAGNOSIS — Z00.6 ENCOUNTER FOR EXAMINATION FOR NORMAL COMPARISON OR CONTROL IN CLINICAL RESEARCH PROGRAM: ICD-10-CM

## 2025-01-21 PROCEDURE — 36415 COLL VENOUS BLD VENIPUNCTURE: CPT

## 2025-01-21 RX ORDER — SEMAGLUTIDE 2.68 MG/ML
INJECTION, SOLUTION SUBCUTANEOUS
Qty: 9 ML | Refills: 1 | Status: SHIPPED | OUTPATIENT
Start: 2025-01-21

## 2025-01-30 LAB
APOB+LDLR+PCSK9 GENE MUT ANL BLD/T: NOT DETECTED
BRCA1+BRCA2 DEL+DUP + FULL MUT ANL BLD/T: NOT DETECTED
MLH1+MSH2+MSH6+PMS2 GN DEL+DUP+FUL M: NOT DETECTED

## 2025-04-29 ENCOUNTER — TELEPHONE (OUTPATIENT)
Dept: FAMILY MEDICINE CLINIC | Facility: CLINIC | Age: 55
End: 2025-04-29

## 2025-04-30 DIAGNOSIS — E11.9 CONTROLLED TYPE 2 DIABETES MELLITUS WITHOUT COMPLICATION, WITHOUT LONG-TERM CURRENT USE OF INSULIN (HCC): ICD-10-CM

## 2025-04-30 NOTE — TELEPHONE ENCOUNTER
Please contact patient regarding Ozempic.  We received a request for  refill for 1 mg dosage however our chart indicates he is taking 2 mg.  Can he clarify?

## 2025-06-09 ENCOUNTER — TELEPHONE (OUTPATIENT)
Dept: FAMILY MEDICINE CLINIC | Facility: CLINIC | Age: 55
End: 2025-06-09

## 2025-07-01 DIAGNOSIS — J45.909 ASTHMA, UNSPECIFIED ASTHMA SEVERITY, UNSPECIFIED WHETHER COMPLICATED, UNSPECIFIED WHETHER PERSISTENT: ICD-10-CM

## 2025-07-01 DIAGNOSIS — I10 ESSENTIAL HYPERTENSION: ICD-10-CM

## 2025-07-01 DIAGNOSIS — E13.9 DIABETES 1.5, MANAGED AS TYPE 2 (HCC): ICD-10-CM

## 2025-07-01 DIAGNOSIS — E78.5 HYPERLIPIDEMIA, UNSPECIFIED HYPERLIPIDEMIA TYPE: ICD-10-CM

## 2025-07-02 DIAGNOSIS — J45.909 ASTHMA, UNSPECIFIED ASTHMA SEVERITY, UNSPECIFIED WHETHER COMPLICATED, UNSPECIFIED WHETHER PERSISTENT: ICD-10-CM

## 2025-07-02 RX ORDER — MONTELUKAST SODIUM 10 MG/1
10 TABLET ORAL DAILY
Qty: 90 TABLET | Refills: 0 | Status: SHIPPED | OUTPATIENT
Start: 2025-07-02

## 2025-07-02 RX ORDER — ATORVASTATIN CALCIUM 20 MG/1
20 TABLET, FILM COATED ORAL DAILY
Qty: 90 TABLET | Refills: 0 | Status: SHIPPED | OUTPATIENT
Start: 2025-07-02

## 2025-07-02 RX ORDER — OLMESARTAN MEDOXOMIL AND HYDROCHLOROTHIAZIDE 40/12.5 40; 12.5 MG/1; MG/1
1 TABLET ORAL DAILY
Qty: 90 TABLET | Refills: 0 | Status: SHIPPED | OUTPATIENT
Start: 2025-07-02

## 2025-07-02 RX ORDER — ALBUTEROL SULFATE 90 UG/1
2 INHALANT RESPIRATORY (INHALATION) EVERY 4 HOURS PRN
Qty: 18 G | Refills: 3 | Status: SHIPPED | OUTPATIENT
Start: 2025-07-02

## 2025-07-02 NOTE — TELEPHONE ENCOUNTER
Spoke with patient. He made an appt for a physical on 9/24and would like to have bloodwork put in for this appt.     Patient uses [facility].